# Patient Record
Sex: FEMALE | Race: WHITE | Employment: OTHER | URBAN - METROPOLITAN AREA
[De-identification: names, ages, dates, MRNs, and addresses within clinical notes are randomized per-mention and may not be internally consistent; named-entity substitution may affect disease eponyms.]

---

## 2017-11-03 ENCOUNTER — HOSPITAL ENCOUNTER (OUTPATIENT)
Dept: MAMMOGRAPHY | Age: 77
Discharge: HOME | End: 2017-11-03
Attending: INTERNAL MEDICINE
Payer: MEDICARE

## 2017-11-03 DIAGNOSIS — E66.01 MORBID OBESITY WITH BMI OF 40.0-44.9, ADULT (HCC): ICD-10-CM

## 2017-11-03 PROCEDURE — 77067 SCR MAMMO BI INCL CAD: CPT

## 2018-11-16 ENCOUNTER — HOSPITAL ENCOUNTER (OUTPATIENT)
Dept: MAMMOGRAPHY | Age: 78
Discharge: HOME | End: 2018-11-16
Attending: INTERNAL MEDICINE
Payer: MEDICARE

## 2018-11-16 DIAGNOSIS — Z78.0 ASYMPTOMATIC MENOPAUSAL STATE: ICD-10-CM

## 2018-11-16 DIAGNOSIS — N95.1 MENOPAUSAL STATE: ICD-10-CM

## 2018-11-16 DIAGNOSIS — Z12.39 SCREENING BREAST EXAMINATION: ICD-10-CM

## 2018-11-16 PROCEDURE — 77067 SCR MAMMO BI INCL CAD: CPT

## 2018-11-16 PROCEDURE — 77080 DXA BONE DENSITY AXIAL: CPT

## 2019-03-15 ENCOUNTER — HOSPITAL ENCOUNTER (OUTPATIENT)
Dept: CT IMAGING | Age: 79
Discharge: HOME | End: 2019-03-15
Attending: ORTHOPAEDIC SURGERY
Payer: MEDICARE

## 2019-03-15 DIAGNOSIS — S70.12XA HEMATOMA OF LEFT THIGH: ICD-10-CM

## 2019-03-15 PROCEDURE — 73700 CT LOWER EXTREMITY W/O DYE: CPT

## 2019-04-09 ENCOUNTER — HOSPITAL ENCOUNTER (OUTPATIENT)
Dept: PREADMISSION TESTING | Age: 79
Discharge: HOME | DRG: 571 | End: 2019-04-09
Payer: MEDICARE

## 2019-04-09 VITALS
RESPIRATION RATE: 12 BRPM | SYSTOLIC BLOOD PRESSURE: 178 MMHG | BODY MASS INDEX: 38.95 KG/M2 | DIASTOLIC BLOOD PRESSURE: 76 MMHG | TEMPERATURE: 97.8 F | WEIGHT: 242.38 LBS | HEIGHT: 66 IN | HEART RATE: 94 BPM

## 2019-04-09 LAB
ANION GAP SERPL CALC-SCNC: 9 MMOL/L (ref 5–15)
ATRIAL RATE: 73 BPM
BASOPHILS # BLD: 0.1 K/UL (ref 0–0.1)
BASOPHILS NFR BLD: 1 % (ref 0–1)
BUN SERPL-MCNC: 27 MG/DL (ref 6–20)
BUN/CREAT SERPL: 28 (ref 12–20)
CALCIUM SERPL-MCNC: 9.6 MG/DL (ref 8.5–10.1)
CALCULATED P AXIS, ECG09: 32 DEGREES
CALCULATED R AXIS, ECG10: 11 DEGREES
CALCULATED T AXIS, ECG11: 22 DEGREES
CHLORIDE SERPL-SCNC: 101 MMOL/L (ref 97–108)
CO2 SERPL-SCNC: 29 MMOL/L (ref 21–32)
CREAT SERPL-MCNC: 0.98 MG/DL (ref 0.55–1.02)
DIAGNOSIS, 93000: NORMAL
DIFFERENTIAL METHOD BLD: ABNORMAL
EOSINOPHIL # BLD: 0.1 K/UL (ref 0–0.4)
EOSINOPHIL NFR BLD: 1 % (ref 0–7)
ERYTHROCYTE [DISTWIDTH] IN BLOOD BY AUTOMATED COUNT: 15.7 % (ref 11.5–14.5)
GLUCOSE SERPL-MCNC: 95 MG/DL (ref 65–100)
HCT VFR BLD AUTO: 45.2 % (ref 35–47)
HGB BLD-MCNC: 14.5 G/DL (ref 11.5–16)
IMM GRANULOCYTES # BLD AUTO: 0.1 K/UL (ref 0–0.04)
IMM GRANULOCYTES NFR BLD AUTO: 1 % (ref 0–0.5)
LYMPHOCYTES # BLD: 1.7 K/UL (ref 0.8–3.5)
LYMPHOCYTES NFR BLD: 23 % (ref 12–49)
MCH RBC QN AUTO: 29.5 PG (ref 26–34)
MCHC RBC AUTO-ENTMCNC: 32.1 G/DL (ref 30–36.5)
MCV RBC AUTO: 92.1 FL (ref 80–99)
MONOCYTES # BLD: 0.5 K/UL (ref 0–1)
MONOCYTES NFR BLD: 7 % (ref 5–13)
NEUTS SEG # BLD: 5 K/UL (ref 1.8–8)
NEUTS SEG NFR BLD: 67 % (ref 32–75)
NRBC # BLD: 0 K/UL (ref 0–0.01)
NRBC BLD-RTO: 0 PER 100 WBC
P-R INTERVAL, ECG05: 146 MS
PLATELET # BLD AUTO: 208 K/UL (ref 150–400)
PMV BLD AUTO: 11.1 FL (ref 8.9–12.9)
POTASSIUM SERPL-SCNC: 3.3 MMOL/L (ref 3.5–5.1)
Q-T INTERVAL, ECG07: 418 MS
QRS DURATION, ECG06: 86 MS
QTC CALCULATION (BEZET), ECG08: 460 MS
RBC # BLD AUTO: 4.91 M/UL (ref 3.8–5.2)
SODIUM SERPL-SCNC: 139 MMOL/L (ref 136–145)
VENTRICULAR RATE, ECG03: 73 BPM
WBC # BLD AUTO: 7.5 K/UL (ref 3.6–11)

## 2019-04-09 PROCEDURE — 36415 COLL VENOUS BLD VENIPUNCTURE: CPT

## 2019-04-09 PROCEDURE — 80048 BASIC METABOLIC PNL TOTAL CA: CPT

## 2019-04-09 PROCEDURE — 93005 ELECTROCARDIOGRAM TRACING: CPT

## 2019-04-09 PROCEDURE — 85025 COMPLETE CBC W/AUTO DIFF WBC: CPT

## 2019-04-10 ENCOUNTER — ANESTHESIA EVENT (OUTPATIENT)
Dept: SURGERY | Age: 79
DRG: 571 | End: 2019-04-10
Payer: MEDICARE

## 2019-04-10 ENCOUNTER — ANESTHESIA (OUTPATIENT)
Dept: SURGERY | Age: 79
DRG: 571 | End: 2019-04-10
Payer: MEDICARE

## 2019-04-10 ENCOUNTER — HOSPITAL ENCOUNTER (INPATIENT)
Age: 79
LOS: 6 days | Discharge: HOME | DRG: 571 | End: 2019-04-16
Attending: ORTHOPAEDIC SURGERY | Admitting: ORTHOPAEDIC SURGERY
Payer: MEDICARE

## 2019-04-10 PROBLEM — L02.416 ABSCESS OF LEFT THIGH: Status: ACTIVE | Noted: 2019-04-10

## 2019-04-10 PROBLEM — M71.162 INFECTION OF LEFT PREPATELLAR BURSA: Status: ACTIVE | Noted: 2019-04-10

## 2019-04-10 LAB
ANION GAP SERPL CALC-SCNC: 8 MMOL/L (ref 5–15)
BUN SERPL-MCNC: 27 MG/DL (ref 6–20)
BUN/CREAT SERPL: 29 (ref 12–20)
CALCIUM SERPL-MCNC: 9.1 MG/DL (ref 8.5–10.1)
CHLORIDE SERPL-SCNC: 104 MMOL/L (ref 97–108)
CO2 SERPL-SCNC: 26 MMOL/L (ref 21–32)
CREAT SERPL-MCNC: 0.93 MG/DL (ref 0.55–1.02)
GLUCOSE BLD STRIP.AUTO-MCNC: 118 MG/DL (ref 65–100)
GLUCOSE SERPL-MCNC: 146 MG/DL (ref 65–100)
HCT VFR BLD AUTO: 41.1 % (ref 35–47)
HGB BLD-MCNC: 13.6 G/DL (ref 11.5–16)
POTASSIUM SERPL-SCNC: 2.9 MMOL/L (ref 3.5–5.1)
SERVICE CMNT-IMP: ABNORMAL
SODIUM SERPL-SCNC: 138 MMOL/L (ref 136–145)

## 2019-04-10 PROCEDURE — 77030008684 HC TU ET CUF COVD -B: Performed by: NURSE ANESTHETIST, CERTIFIED REGISTERED

## 2019-04-10 PROCEDURE — 77030002916 HC SUT ETHLN J&J -A: Performed by: ORTHOPAEDIC SURGERY

## 2019-04-10 PROCEDURE — 80048 BASIC METABOLIC PNL TOTAL CA: CPT

## 2019-04-10 PROCEDURE — 74011000250 HC RX REV CODE- 250

## 2019-04-10 PROCEDURE — 74011250636 HC RX REV CODE- 250/636: Performed by: ANESTHESIOLOGY

## 2019-04-10 PROCEDURE — 76010000149 HC OR TIME 1 TO 1.5 HR: Performed by: ORTHOPAEDIC SURGERY

## 2019-04-10 PROCEDURE — 77030020782 HC GWN BAIR PAWS FLX 3M -B

## 2019-04-10 PROCEDURE — 0JCM0ZZ EXTIRPATION OF MATTER FROM LEFT UPPER LEG SUBCUTANEOUS TISSUE AND FASCIA, OPEN APPROACH: ICD-10-PCS | Performed by: ORTHOPAEDIC SURGERY

## 2019-04-10 PROCEDURE — 65270000029 HC RM PRIVATE

## 2019-04-10 PROCEDURE — 77030019895 HC PCKNG STRP IODO -A: Performed by: ORTHOPAEDIC SURGERY

## 2019-04-10 PROCEDURE — 85018 HEMOGLOBIN: CPT

## 2019-04-10 PROCEDURE — 36415 COLL VENOUS BLD VENIPUNCTURE: CPT

## 2019-04-10 PROCEDURE — 87205 SMEAR GRAM STAIN: CPT

## 2019-04-10 PROCEDURE — 74011250637 HC RX REV CODE- 250/637: Performed by: PHYSICIAN ASSISTANT

## 2019-04-10 PROCEDURE — 74011250636 HC RX REV CODE- 250/636: Performed by: PHYSICIAN ASSISTANT

## 2019-04-10 PROCEDURE — 77030000032 HC CUF TRNQT ZIMM -B: Performed by: ORTHOPAEDIC SURGERY

## 2019-04-10 PROCEDURE — 88300 SURGICAL PATH GROSS: CPT

## 2019-04-10 PROCEDURE — 77030039497 HC CST PAD STERILE CHCS -A: Performed by: ORTHOPAEDIC SURGERY

## 2019-04-10 PROCEDURE — 82962 GLUCOSE BLOOD TEST: CPT

## 2019-04-10 PROCEDURE — 02HV33Z INSERTION OF INFUSION DEVICE INTO SUPERIOR VENA CAVA, PERCUTANEOUS APPROACH: ICD-10-PCS | Performed by: ORTHOPAEDIC SURGERY

## 2019-04-10 PROCEDURE — L1830 KO IMMOB CANVAS LONG PRE OTS: HCPCS | Performed by: ORTHOPAEDIC SURGERY

## 2019-04-10 PROCEDURE — 87075 CULTR BACTERIA EXCEPT BLOOD: CPT

## 2019-04-10 PROCEDURE — 74011250636 HC RX REV CODE- 250/636

## 2019-04-10 PROCEDURE — 77030026438 HC STYL ET INTUB CARD -A: Performed by: NURSE ANESTHETIST, CERTIFIED REGISTERED

## 2019-04-10 PROCEDURE — 77030031139 HC SUT VCRL2 J&J -A: Performed by: ORTHOPAEDIC SURGERY

## 2019-04-10 PROCEDURE — 0JBM0ZZ EXCISION OF LEFT UPPER LEG SUBCUTANEOUS TISSUE AND FASCIA, OPEN APPROACH: ICD-10-PCS | Performed by: ORTHOPAEDIC SURGERY

## 2019-04-10 PROCEDURE — 77030011640 HC PAD GRND REM COVD -A: Performed by: ORTHOPAEDIC SURGERY

## 2019-04-10 PROCEDURE — 0J9M0ZZ DRAINAGE OF LEFT UPPER LEG SUBCUTANEOUS TISSUE AND FASCIA, OPEN APPROACH: ICD-10-PCS | Performed by: ORTHOPAEDIC SURGERY

## 2019-04-10 PROCEDURE — 76060000034 HC ANESTHESIA 1.5 TO 2 HR: Performed by: ORTHOPAEDIC SURGERY

## 2019-04-10 PROCEDURE — 77030018836 HC SOL IRR NACL ICUM -A: Performed by: ORTHOPAEDIC SURGERY

## 2019-04-10 PROCEDURE — 74011250637 HC RX REV CODE- 250/637: Performed by: ANESTHESIOLOGY

## 2019-04-10 PROCEDURE — 76210000016 HC OR PH I REC 1 TO 1.5 HR: Performed by: ORTHOPAEDIC SURGERY

## 2019-04-10 RX ORDER — GUAIFENESIN 100 MG/5ML
81 LIQUID (ML) ORAL
Status: DISCONTINUED | OUTPATIENT
Start: 2019-04-10 | End: 2019-04-16 | Stop reason: HOSPADM

## 2019-04-10 RX ORDER — MIDAZOLAM HYDROCHLORIDE 1 MG/ML
0.5 INJECTION, SOLUTION INTRAMUSCULAR; INTRAVENOUS
Status: DISCONTINUED | OUTPATIENT
Start: 2019-04-10 | End: 2019-04-10 | Stop reason: HOSPADM

## 2019-04-10 RX ORDER — PROPOFOL 10 MG/ML
INJECTION, EMULSION INTRAVENOUS AS NEEDED
Status: DISCONTINUED | OUTPATIENT
Start: 2019-04-10 | End: 2019-04-10 | Stop reason: HOSPADM

## 2019-04-10 RX ORDER — PRAVASTATIN SODIUM 20 MG/1
40 TABLET ORAL
Status: DISCONTINUED | OUTPATIENT
Start: 2019-04-10 | End: 2019-04-16 | Stop reason: HOSPADM

## 2019-04-10 RX ORDER — MIDAZOLAM HYDROCHLORIDE 1 MG/ML
1 INJECTION, SOLUTION INTRAMUSCULAR; INTRAVENOUS AS NEEDED
Status: DISCONTINUED | OUTPATIENT
Start: 2019-04-10 | End: 2019-04-10 | Stop reason: HOSPADM

## 2019-04-10 RX ORDER — HYDROCODONE BITARTRATE AND ACETAMINOPHEN 5; 325 MG/1; MG/1
1 TABLET ORAL
Status: DISCONTINUED | OUTPATIENT
Start: 2019-04-10 | End: 2019-04-16 | Stop reason: HOSPADM

## 2019-04-10 RX ORDER — ONDANSETRON 2 MG/ML
INJECTION INTRAMUSCULAR; INTRAVENOUS AS NEEDED
Status: DISCONTINUED | OUTPATIENT
Start: 2019-04-10 | End: 2019-04-10 | Stop reason: HOSPADM

## 2019-04-10 RX ORDER — SODIUM CHLORIDE 9 MG/ML
100 INJECTION, SOLUTION INTRAVENOUS CONTINUOUS
Status: DISPENSED | OUTPATIENT
Start: 2019-04-10 | End: 2019-04-11

## 2019-04-10 RX ORDER — SODIUM CHLORIDE 0.9 % (FLUSH) 0.9 %
5-40 SYRINGE (ML) INJECTION EVERY 8 HOURS
Status: DISCONTINUED | OUTPATIENT
Start: 2019-04-10 | End: 2019-04-10 | Stop reason: HOSPADM

## 2019-04-10 RX ORDER — ACETAMINOPHEN 325 MG/1
650 TABLET ORAL ONCE
Status: COMPLETED | OUTPATIENT
Start: 2019-04-10 | End: 2019-04-10

## 2019-04-10 RX ORDER — EPHEDRINE SULFATE/0.9% NACL/PF 50 MG/5 ML
SYRINGE (ML) INTRAVENOUS AS NEEDED
Status: DISCONTINUED | OUTPATIENT
Start: 2019-04-10 | End: 2019-04-10 | Stop reason: HOSPADM

## 2019-04-10 RX ORDER — LIDOCAINE HYDROCHLORIDE 10 MG/ML
0.1 INJECTION, SOLUTION EPIDURAL; INFILTRATION; INTRACAUDAL; PERINEURAL AS NEEDED
Status: DISCONTINUED | OUTPATIENT
Start: 2019-04-10 | End: 2019-04-10 | Stop reason: HOSPADM

## 2019-04-10 RX ORDER — DEXAMETHASONE SODIUM PHOSPHATE 4 MG/ML
INJECTION, SOLUTION INTRA-ARTICULAR; INTRALESIONAL; INTRAMUSCULAR; INTRAVENOUS; SOFT TISSUE AS NEEDED
Status: DISCONTINUED | OUTPATIENT
Start: 2019-04-10 | End: 2019-04-10 | Stop reason: HOSPADM

## 2019-04-10 RX ORDER — AMLODIPINE BESYLATE 5 MG/1
10 TABLET ORAL DAILY
Status: DISCONTINUED | OUTPATIENT
Start: 2019-04-11 | End: 2019-04-16 | Stop reason: HOSPADM

## 2019-04-10 RX ORDER — ONDANSETRON 4 MG/1
4 TABLET, ORALLY DISINTEGRATING ORAL
Status: DISCONTINUED | OUTPATIENT
Start: 2019-04-10 | End: 2019-04-16 | Stop reason: HOSPADM

## 2019-04-10 RX ORDER — FENTANYL CITRATE 50 UG/ML
25 INJECTION, SOLUTION INTRAMUSCULAR; INTRAVENOUS
Status: DISCONTINUED | OUTPATIENT
Start: 2019-04-10 | End: 2019-04-10 | Stop reason: HOSPADM

## 2019-04-10 RX ORDER — SODIUM CHLORIDE 9 MG/ML
25 INJECTION, SOLUTION INTRAVENOUS CONTINUOUS
Status: DISCONTINUED | OUTPATIENT
Start: 2019-04-10 | End: 2019-04-10 | Stop reason: HOSPADM

## 2019-04-10 RX ORDER — ROPIVACAINE HYDROCHLORIDE 5 MG/ML
30 INJECTION, SOLUTION EPIDURAL; INFILTRATION; PERINEURAL ONCE
Status: DISCONTINUED | OUTPATIENT
Start: 2019-04-10 | End: 2019-04-10 | Stop reason: HOSPADM

## 2019-04-10 RX ORDER — FENTANYL CITRATE 50 UG/ML
INJECTION, SOLUTION INTRAMUSCULAR; INTRAVENOUS AS NEEDED
Status: DISCONTINUED | OUTPATIENT
Start: 2019-04-10 | End: 2019-04-10 | Stop reason: HOSPADM

## 2019-04-10 RX ORDER — DEXMEDETOMIDINE HYDROCHLORIDE 4 UG/ML
INJECTION, SOLUTION INTRAVENOUS AS NEEDED
Status: DISCONTINUED | OUTPATIENT
Start: 2019-04-10 | End: 2019-04-10 | Stop reason: HOSPADM

## 2019-04-10 RX ORDER — SODIUM CHLORIDE 0.9 % (FLUSH) 0.9 %
5-40 SYRINGE (ML) INJECTION AS NEEDED
Status: DISCONTINUED | OUTPATIENT
Start: 2019-04-10 | End: 2019-04-10 | Stop reason: HOSPADM

## 2019-04-10 RX ORDER — MIDAZOLAM HYDROCHLORIDE 1 MG/ML
INJECTION, SOLUTION INTRAMUSCULAR; INTRAVENOUS AS NEEDED
Status: DISCONTINUED | OUTPATIENT
Start: 2019-04-10 | End: 2019-04-10 | Stop reason: HOSPADM

## 2019-04-10 RX ORDER — SODIUM CHLORIDE, SODIUM LACTATE, POTASSIUM CHLORIDE, CALCIUM CHLORIDE 600; 310; 30; 20 MG/100ML; MG/100ML; MG/100ML; MG/100ML
75 INJECTION, SOLUTION INTRAVENOUS CONTINUOUS
Status: DISCONTINUED | OUTPATIENT
Start: 2019-04-10 | End: 2019-04-10 | Stop reason: HOSPADM

## 2019-04-10 RX ORDER — MORPHINE SULFATE 10 MG/ML
2 INJECTION, SOLUTION INTRAMUSCULAR; INTRAVENOUS
Status: DISCONTINUED | OUTPATIENT
Start: 2019-04-10 | End: 2019-04-10 | Stop reason: HOSPADM

## 2019-04-10 RX ORDER — FENTANYL CITRATE 50 UG/ML
50 INJECTION, SOLUTION INTRAMUSCULAR; INTRAVENOUS AS NEEDED
Status: DISCONTINUED | OUTPATIENT
Start: 2019-04-10 | End: 2019-04-10 | Stop reason: HOSPADM

## 2019-04-10 RX ORDER — CEFAZOLIN SODIUM/WATER 2 G/20 ML
2 SYRINGE (ML) INTRAVENOUS EVERY 8 HOURS
Status: COMPLETED | OUTPATIENT
Start: 2019-04-10 | End: 2019-04-10

## 2019-04-10 RX ORDER — ACETAMINOPHEN 325 MG/1
650 TABLET ORAL
Status: DISCONTINUED | OUTPATIENT
Start: 2019-04-10 | End: 2019-04-16 | Stop reason: HOSPADM

## 2019-04-10 RX ORDER — PHENYLEPHRINE HCL IN 0.9% NACL 0.4MG/10ML
SYRINGE (ML) INTRAVENOUS AS NEEDED
Status: DISCONTINUED | OUTPATIENT
Start: 2019-04-10 | End: 2019-04-10 | Stop reason: HOSPADM

## 2019-04-10 RX ORDER — SODIUM CHLORIDE, SODIUM LACTATE, POTASSIUM CHLORIDE, CALCIUM CHLORIDE 600; 310; 30; 20 MG/100ML; MG/100ML; MG/100ML; MG/100ML
125 INJECTION, SOLUTION INTRAVENOUS CONTINUOUS
Status: DISCONTINUED | OUTPATIENT
Start: 2019-04-10 | End: 2019-04-10 | Stop reason: HOSPADM

## 2019-04-10 RX ORDER — SODIUM CHLORIDE 0.9 % (FLUSH) 0.9 %
5-40 SYRINGE (ML) INJECTION EVERY 8 HOURS
Status: DISCONTINUED | OUTPATIENT
Start: 2019-04-10 | End: 2019-04-16 | Stop reason: HOSPADM

## 2019-04-10 RX ORDER — SUCCINYLCHOLINE CHLORIDE 20 MG/ML
INJECTION INTRAMUSCULAR; INTRAVENOUS AS NEEDED
Status: DISCONTINUED | OUTPATIENT
Start: 2019-04-10 | End: 2019-04-10 | Stop reason: HOSPADM

## 2019-04-10 RX ORDER — CLONIDINE HYDROCHLORIDE 0.1 MG/1
0.1 TABLET ORAL 2 TIMES DAILY
Status: DISCONTINUED | OUTPATIENT
Start: 2019-04-10 | End: 2019-04-13

## 2019-04-10 RX ORDER — HYDROMORPHONE HYDROCHLORIDE 1 MG/ML
0.2 INJECTION, SOLUTION INTRAMUSCULAR; INTRAVENOUS; SUBCUTANEOUS
Status: DISCONTINUED | OUTPATIENT
Start: 2019-04-10 | End: 2019-04-10 | Stop reason: HOSPADM

## 2019-04-10 RX ORDER — DIPHENHYDRAMINE HCL 25 MG
25 CAPSULE ORAL
Status: DISCONTINUED | OUTPATIENT
Start: 2019-04-10 | End: 2019-04-16 | Stop reason: HOSPADM

## 2019-04-10 RX ORDER — DIPHENHYDRAMINE HYDROCHLORIDE 50 MG/ML
12.5 INJECTION, SOLUTION INTRAMUSCULAR; INTRAVENOUS AS NEEDED
Status: DISCONTINUED | OUTPATIENT
Start: 2019-04-10 | End: 2019-04-10 | Stop reason: HOSPADM

## 2019-04-10 RX ORDER — HYDROMORPHONE HYDROCHLORIDE 1 MG/ML
1 INJECTION, SOLUTION INTRAMUSCULAR; INTRAVENOUS; SUBCUTANEOUS
Status: DISCONTINUED | OUTPATIENT
Start: 2019-04-10 | End: 2019-04-16 | Stop reason: HOSPADM

## 2019-04-10 RX ORDER — CEFAZOLIN SODIUM/WATER 2 G/20 ML
2 SYRINGE (ML) INTRAVENOUS EVERY 8 HOURS
Status: COMPLETED | OUTPATIENT
Start: 2019-04-10 | End: 2019-04-11

## 2019-04-10 RX ORDER — CHLORTHALIDONE 25 MG/1
25 TABLET ORAL DAILY
Status: DISCONTINUED | OUTPATIENT
Start: 2019-04-11 | End: 2019-04-16 | Stop reason: HOSPADM

## 2019-04-10 RX ORDER — ROCURONIUM BROMIDE 10 MG/ML
INJECTION, SOLUTION INTRAVENOUS AS NEEDED
Status: DISCONTINUED | OUTPATIENT
Start: 2019-04-10 | End: 2019-04-10 | Stop reason: HOSPADM

## 2019-04-10 RX ORDER — NALOXONE HYDROCHLORIDE 0.4 MG/ML
0.4 INJECTION, SOLUTION INTRAMUSCULAR; INTRAVENOUS; SUBCUTANEOUS AS NEEDED
Status: DISCONTINUED | OUTPATIENT
Start: 2019-04-10 | End: 2019-04-16 | Stop reason: HOSPADM

## 2019-04-10 RX ORDER — ONDANSETRON 2 MG/ML
4 INJECTION INTRAMUSCULAR; INTRAVENOUS AS NEEDED
Status: DISCONTINUED | OUTPATIENT
Start: 2019-04-10 | End: 2019-04-10 | Stop reason: HOSPADM

## 2019-04-10 RX ORDER — SODIUM CHLORIDE 0.9 % (FLUSH) 0.9 %
5-40 SYRINGE (ML) INJECTION AS NEEDED
Status: DISCONTINUED | OUTPATIENT
Start: 2019-04-10 | End: 2019-04-16 | Stop reason: HOSPADM

## 2019-04-10 RX ORDER — LIDOCAINE HYDROCHLORIDE 20 MG/ML
INJECTION, SOLUTION EPIDURAL; INFILTRATION; INTRACAUDAL; PERINEURAL AS NEEDED
Status: DISCONTINUED | OUTPATIENT
Start: 2019-04-10 | End: 2019-04-10 | Stop reason: HOSPADM

## 2019-04-10 RX ADMIN — SODIUM CHLORIDE 100 ML/HR: 900 INJECTION, SOLUTION INTRAVENOUS at 23:21

## 2019-04-10 RX ADMIN — Medication 5 MG: at 11:25

## 2019-04-10 RX ADMIN — DEXMEDETOMIDINE HYDROCHLORIDE 4 MCG: 4 INJECTION, SOLUTION INTRAVENOUS at 11:08

## 2019-04-10 RX ADMIN — Medication 10 MG: at 11:33

## 2019-04-10 RX ADMIN — Medication 5 MG: at 11:15

## 2019-04-10 RX ADMIN — Medication 2 G: at 19:43

## 2019-04-10 RX ADMIN — ACETAMINOPHEN 650 MG: 325 TABLET ORAL at 19:43

## 2019-04-10 RX ADMIN — Medication 40 MCG: at 11:21

## 2019-04-10 RX ADMIN — FENTANYL CITRATE 50 MCG: 50 INJECTION, SOLUTION INTRAMUSCULAR; INTRAVENOUS at 11:57

## 2019-04-10 RX ADMIN — PROPOFOL 50 MG: 10 INJECTION, EMULSION INTRAVENOUS at 11:56

## 2019-04-10 RX ADMIN — DEXAMETHASONE SODIUM PHOSPHATE 4 MG: 4 INJECTION, SOLUTION INTRA-ARTICULAR; INTRALESIONAL; INTRAMUSCULAR; INTRAVENOUS; SOFT TISSUE at 10:57

## 2019-04-10 RX ADMIN — ASPIRIN 81 MG CHEWABLE TABLET 81 MG: 81 TABLET CHEWABLE at 22:47

## 2019-04-10 RX ADMIN — ACETAMINOPHEN 650 MG: 325 TABLET ORAL at 10:38

## 2019-04-10 RX ADMIN — SODIUM CHLORIDE 100 ML/HR: 900 INJECTION, SOLUTION INTRAVENOUS at 13:42

## 2019-04-10 RX ADMIN — LIDOCAINE HYDROCHLORIDE 60 MG: 20 INJECTION, SOLUTION EPIDURAL; INFILTRATION; INTRACAUDAL; PERINEURAL at 10:49

## 2019-04-10 RX ADMIN — Medication 40 MCG: at 10:50

## 2019-04-10 RX ADMIN — PRAVASTATIN SODIUM 40 MG: 20 TABLET ORAL at 22:47

## 2019-04-10 RX ADMIN — DEXMEDETOMIDINE HYDROCHLORIDE 4 MCG: 4 INJECTION, SOLUTION INTRAVENOUS at 11:57

## 2019-04-10 RX ADMIN — Medication 25 MG: at 12:10

## 2019-04-10 RX ADMIN — DEXMEDETOMIDINE HYDROCHLORIDE 4 MCG: 4 INJECTION, SOLUTION INTRAVENOUS at 11:21

## 2019-04-10 RX ADMIN — PROPOFOL 20 MG: 10 INJECTION, EMULSION INTRAVENOUS at 11:04

## 2019-04-10 RX ADMIN — ONDANSETRON 4 MG: 2 INJECTION INTRAMUSCULAR; INTRAVENOUS at 10:57

## 2019-04-10 RX ADMIN — Medication 100 MCG: at 12:07

## 2019-04-10 RX ADMIN — PROPOFOL 50 MG: 10 INJECTION, EMULSION INTRAVENOUS at 10:51

## 2019-04-10 RX ADMIN — Medication 80 MCG: at 11:32

## 2019-04-10 RX ADMIN — Medication 80 MCG: at 10:57

## 2019-04-10 RX ADMIN — PROPOFOL 100 MG: 10 INJECTION, EMULSION INTRAVENOUS at 10:50

## 2019-04-10 RX ADMIN — FENTANYL CITRATE 50 MCG: 50 INJECTION, SOLUTION INTRAMUSCULAR; INTRAVENOUS at 10:49

## 2019-04-10 RX ADMIN — Medication 2 G: at 10:57

## 2019-04-10 RX ADMIN — SUCCINYLCHOLINE CHLORIDE 120 MG: 20 INJECTION INTRAMUSCULAR; INTRAVENOUS at 10:51

## 2019-04-10 RX ADMIN — MIDAZOLAM HYDROCHLORIDE 2 MG: 1 INJECTION, SOLUTION INTRAMUSCULAR; INTRAVENOUS at 10:45

## 2019-04-10 RX ADMIN — SODIUM CHLORIDE, POTASSIUM CHLORIDE, SODIUM LACTATE AND CALCIUM CHLORIDE: 600; 310; 30; 20 INJECTION, SOLUTION INTRAVENOUS at 10:40

## 2019-04-10 RX ADMIN — FENTANYL CITRATE 50 MCG: 50 INJECTION, SOLUTION INTRAMUSCULAR; INTRAVENOUS at 11:17

## 2019-04-10 RX ADMIN — Medication 5 MG: at 11:29

## 2019-04-10 RX ADMIN — Medication 40 MCG: at 11:10

## 2019-04-10 RX ADMIN — DEXMEDETOMIDINE HYDROCHLORIDE 4 MCG: 4 INJECTION, SOLUTION INTRAVENOUS at 11:04

## 2019-04-10 RX ADMIN — ROCURONIUM BROMIDE 5 MG: 10 INJECTION, SOLUTION INTRAVENOUS at 10:50

## 2019-04-10 RX ADMIN — DEXMEDETOMIDINE HYDROCHLORIDE 4 MCG: 4 INJECTION, SOLUTION INTRAVENOUS at 11:53

## 2019-04-10 RX ADMIN — Medication 10 ML: at 19:45

## 2019-04-10 NOTE — PERIOP NOTES
TRANSFER - OUT REPORT:    Verbal report given to SANJANA BALL(name) on Bethany Sierra  being transferred to 577(unit) for routine post - op       Report consisted of patients Situation, Background, Assessment and   Recommendations(SBAR). Time Pre op antibiotic given:1057  Anesthesia Stop time: 6572  Castelan Present on Transfer to floor:no  Order for Castelan on Chart:no  Discharge Prescriptions with Chart:no    Information from the following report(s) SBAR, OR Summary, Procedure Summary, Intake/Output, MAR, Recent Results, Med Rec Status, Cardiac Rhythm NSR and Alarm Parameters  was reviewed with the receiving nurse. Opportunity for questions and clarification was provided. Is the patient on 02? YES       L/Min 2       Other nc    Is the patient on a monitor? NO    Is the nurse transporting with the patient? NO    Surgical Waiting Area notified of patient's transfer from PACU? YES      The following personal items collected during your admission accompanied patient upon transfer:   Dental Appliance: Dental Appliances: Lowers, Uppers(uppers and lowers to pacu)  Vision:    Hearing Aid:    Jewelry: Jewelry: None  Clothing: Clothing: (clothes to pacu)  Other Valuables:  Other Valuables: Cane(cane to pacu)  Valuables sent to safe:

## 2019-04-10 NOTE — H&P
Chief Complaint: Follow-up of the Left Knee     Dawit Oliver, who has a SHx significant for distal femur ORIF in 12/21/17, comes in to the clinic today for follow-up of her left knee. Patient initially had a contact injury about the anterior aspect of the left knee earlier this month. Subsequently, she developed erythema and swelling about the patella superiorly. She eventually developed cellulitis and an abscess about the bursa to the left knee. We've been treating her conservatively for this with po antibiotics. We've recently switched her over to Cipro. Today, the patient notes that she has some healing about the anteromedial aspect of the knee with some subcutaneous scabbing over the area; however, she did note that the area opened yesterday and she had some purulent drainage. No recent injuries.         Review of Systems   4/9/2019     Constitutional: Unexplained: Positive  Genitourinary: Frequent Urination: Negative  HEENT: Vision Loss: Negative  Neurological: Memory Loss: Negative  Integumentary: Rash: Positive  Cardiovascular: Palpatations: Negative  Hematologic: Bruises/Bleeds Easily: Positive  Gastrointestinal: Constipation: Negative  Immunological: Seasonal Allergies: Negative  Musculoskeletal: Joint Pain: Negative     Medical History        Past Medical History:   Diagnosis Date    no relevant past medical history              Surgical History         Past Surgical History:   Procedure Laterality Date    HIP SURGERY        JOINT REPLACEMENT        NO RELEVANT SURGERIES                Objective:      There were no vitals filed for this visit.     Constitutional:  No acute distress. Well nourished. Well developed. Eyes:  Sclera are nonicteric. Respiratory:  No labored breathing. Cardiovascular:  No marked edema. Skin:  No marked skin ulcers. Neurological:  No marked sensory loss noted. Psychiatric: Alert and oriented x3.   Musculoskeletal      On exam of the left knee reveals a small opening about the anteromedial aspect of the knee. There appears to a small amount of subcutaneous opening area perhaps into the VMO with a small amount of purulent material. No neck pain. No numbness or paresthesias in the hands. No effusion. No cellulitis or rash. I detect no motor or sensory deficits in the upper extremities. The neurovascular status of the upper extremities is intact.        Imaging/Studies:            No imaging obtained      Assessment:          Patient Active Problem List   Diagnosis    Essential hypertension    Morbid obesity with BMI of 40.0-44.9, adult    Osteoarthritis of right hip    Primary localized osteoarthrosis, pelvic region and thigh    Hyperlipidemia             ICD-10-CM   1. Cellulitis of other specified site L03.818   2. Abscess of bursa of left knee M71.062   3. Purulent abscess L02.91            Plan:   I reviewed my findings with the patient. With her persistent purulent drainage, I am now concerned underlying fluid collection. The patient has been on antibiotics and wound care. At this point, I think we need to proceed with I&D and debridement of the left knee. I explained the hospitalization, post-op and rehabilitation for the procedure. We discussed all complications associated with the surgery, including the chance of infection, DVT, and pulmonary embolus. I explained the use of antibiotics and anti-coagulants following surgery to prevent infection and DVT and we discussed the course of post-op physical therapy for rehabilitation.     She was in agreement with the proposed plan. She will follow-up with us tomorrow for I&D and debridement of the left knee. Based on today's findings, this should not involve the knee joint. Depending on the surgical findings, we may send the patient home or admit her to the hospital for IV antibiotics prn.        PROCEDURE: Incision and drainage of left prepatellar bursa, possible bursectomy

## 2019-04-10 NOTE — BRIEF OP NOTE
BRIEF OPERATIVE NOTE    Date of Procedure: 4/10/2019   Preoperative Diagnosis: LEFT PREPATELLAR BURSA  Postoperative Diagnosis: LEFT PREPATELLAR BURSA    Procedure(s):  INCISION AND DRAINAGE LEFT PREPATELLAR BURSA  Surgeon(s) and Role:     * Christian Clement MD - Primary         Surgical Assistant: Sadia Almeida PA-C    Surgical Staff:  Circ-1: Leroy Ndiaye RN  Circ-Relief: Kaylyn Martins  Circ-Intern: Galdino Mckinney RN  Physician Assistant: Selam Varela PA-C  Scrub Tech-1: Diane Goldstein  Scrub Tech-Relief: June Patricio  Surg Asst-1: Kvng Baker  Event Time In Time Out   Incision Start 1116    Incision Close 1203      Anesthesia: General   Estimated Blood Loss: 20 mL  Specimens:   ID Type Source Tests Collected by Time Destination   1 : foreign body medial left thigh Fresh Thigh  Christian Clement MD 4/10/2019 1140 Pathology   1 : left knee hematoma medial side Wound Knee  AEROBIC/ANAEROBIC CULTURE Christian Clement MD 4/10/2019 1127 Microbiology   2 : left knee hematoma medial side Wound Knee  AEROBIC/ANAEROBIC CULTURE Christian Clement MD 4/10/2019 1129 Microbiology      Findings: Foreign body medial thigh, abscess   Complications: None.   Implants: * No implants in log *

## 2019-04-10 NOTE — ANESTHESIA PREPROCEDURE EVALUATION
Relevant Problems No relevant active problems Anesthetic History No history of anesthetic complications Review of Systems / Medical History Patient summary reviewed, nursing notes reviewed and pertinent labs reviewed Pulmonary Within defined limits Neuro/Psych Within defined limits Cardiovascular Hypertension: well controlled GI/Hepatic/Renal 
Within defined limits Endo/Other Morbid obesity and arthritis Other Findings Physical Exam 
 
Airway Mallampati: II 
TM Distance: > 6 cm Neck ROM: normal range of motion Mouth opening: Normal 
 
 Cardiovascular Regular rate and rhythm,  S1 and S2 normal,  no murmur, click, rub, or gallop Dental 
No notable dental hx Pulmonary Breath sounds clear to auscultation Abdominal 
GI exam deferred Other Findings Anesthetic Plan ASA: 3 Anesthesia type: general 
 
 
 
 
Induction: Intravenous Anesthetic plan and risks discussed with: Patient

## 2019-04-10 NOTE — ANESTHESIA POSTPROCEDURE EVALUATION
Post-Anesthesia Evaluation and Assessment Patient: Med Galvez MRN: [id number]  SSN: [id number] YOB: 1940  Age: 70 y.o. Sex: female I have evaluated the patient and they are stable and ready for discharge from the PACU. Cardiovascular Function/Vital Signs Visit Vitals /44 Pulse 71 Temp 36.7 °C (98.1 °F) Resp 17 Ht 5' 6\" (1.676 m) Wt 109.8 kg (242 lb) SpO2 95% BMI 39.06 kg/m² Patient is status post General anesthesia for Procedure(s): 
INCISION AND DRAINAGE LEFT PREPATELLAR BURSA. Nausea/Vomiting: None Postoperative hydration reviewed and adequate. Pain: 
Pain Scale 1: Numeric (0 - 10) (04/10/19 1007) Pain Intensity 1: 0 (04/10/19 1007) Managed Neurological Status:  
Neuro (WDL): Within Defined Limits (04/10/19 1032) At baseline Mental Status, Level of Consciousness: Alert and  oriented to person, place, and time Pulmonary Status:  
O2 Device: Nasal cannula (04/10/19 1219) Adequate oxygenation and airway patent Complications related to anesthesia: None Post-anesthesia assessment completed. No concerns Signed By: George Perez MD   
 April 10, 2019 Procedure(s): 
INCISION AND DRAINAGE LEFT PREPATELLAR BURSA. 
 
general 
 
<BSHSIANPOST> Vitals Value Taken Time /44 4/10/2019 12:19 PM  
Temp 36.7 °C (98.1 °F) 4/10/2019 12:19 PM  
Pulse 74 4/10/2019 12:21 PM  
Resp 18 4/10/2019 12:21 PM  
SpO2 95 % 4/10/2019 12:21 PM  
Vitals shown include unvalidated device data.

## 2019-04-11 LAB
CRP SERPL-MCNC: 0.67 MG/DL (ref 0–0.6)
ERYTHROCYTE [SEDIMENTATION RATE] IN BLOOD: 40 MM/HR (ref 0–30)

## 2019-04-11 PROCEDURE — 74011250636 HC RX REV CODE- 250/636: Performed by: PHYSICIAN ASSISTANT

## 2019-04-11 PROCEDURE — 86140 C-REACTIVE PROTEIN: CPT

## 2019-04-11 PROCEDURE — 74011250636 HC RX REV CODE- 250/636: Performed by: INTERNAL MEDICINE

## 2019-04-11 PROCEDURE — 74011000258 HC RX REV CODE- 258: Performed by: INTERNAL MEDICINE

## 2019-04-11 PROCEDURE — 65270000029 HC RM PRIVATE

## 2019-04-11 PROCEDURE — 85652 RBC SED RATE AUTOMATED: CPT

## 2019-04-11 PROCEDURE — 74011250637 HC RX REV CODE- 250/637: Performed by: PHYSICIAN ASSISTANT

## 2019-04-11 PROCEDURE — 94760 N-INVAS EAR/PLS OXIMETRY 1: CPT

## 2019-04-11 PROCEDURE — 36415 COLL VENOUS BLD VENIPUNCTURE: CPT

## 2019-04-11 RX ADMIN — ACETAMINOPHEN 650 MG: 325 TABLET ORAL at 14:53

## 2019-04-11 RX ADMIN — CLONIDINE HYDROCHLORIDE 0.1 MG: 0.1 TABLET ORAL at 17:48

## 2019-04-11 RX ADMIN — PIPERACILLIN SODIUM,TAZOBACTAM SODIUM 3.38 G: 3; .375 INJECTION, POWDER, FOR SOLUTION INTRAVENOUS at 13:43

## 2019-04-11 RX ADMIN — ACETAMINOPHEN 650 MG: 325 TABLET ORAL at 02:42

## 2019-04-11 RX ADMIN — Medication 2 G: at 03:00

## 2019-04-11 RX ADMIN — PIPERACILLIN SODIUM,TAZOBACTAM SODIUM 3.38 G: 3; .375 INJECTION, POWDER, FOR SOLUTION INTRAVENOUS at 05:37

## 2019-04-11 RX ADMIN — ASPIRIN 81 MG CHEWABLE TABLET 81 MG: 81 TABLET CHEWABLE at 21:49

## 2019-04-11 RX ADMIN — PRAVASTATIN SODIUM 40 MG: 20 TABLET ORAL at 21:49

## 2019-04-11 RX ADMIN — ACETAMINOPHEN 650 MG: 325 TABLET ORAL at 08:26

## 2019-04-11 RX ADMIN — PIPERACILLIN SODIUM,TAZOBACTAM SODIUM 3.38 G: 3; .375 INJECTION, POWDER, FOR SOLUTION INTRAVENOUS at 21:49

## 2019-04-11 NOTE — PROGRESS NOTES
Infectious Diseases Consultation     Please see dictated note     Impression      1. Indolent infection distal left thigh S/P ORIF of an open comminuted fracture of the distal left femur in Dec 2017 -- S/P surgical I&D on 4/10/2019 with removal of a foreign body deep in the wound: Difficult problem as she likely has infection of the hardware and fracture site. The bone has not healed enough to allow removal of the hardware. 2. HTN    3. Obesity      Recommend:      1. Zosyn pending intraop cultures    2.  Baseline ESR and CRP       Thanks,   Bishop Herlinda MD  4/10/2019  9:34 PM

## 2019-04-11 NOTE — PROGRESS NOTES
ORTHO POSTOP PROGRESS NOTE      Subjective:     Post-Operative Day: 1 Status Post left thigh I & D  Systemic or Specific Complaints:No Complaints    Objective:     Patient Vitals for the past 24 hrs:   BP Temp Pulse Resp SpO2   04/11/19 0821 119/70 97.9 °F (36.6 °C) 81 16 95 %   04/11/19 0345 135/59 97.3 °F (36.3 °C) 65 16 94 %   04/10/19 2135 115/67 97.3 °F (36.3 °C) 67 16 93 %   04/10/19 1850 122/62 98 °F (36.7 °C) 80 16 94 %   04/10/19 1630 130/77 97.7 °F (36.5 °C) 72 14 94 %   04/10/19 1515 122/65 97.7 °F (36.5 °C) 73 16 94 %   04/10/19 1419 123/77 96.4 °F (35.8 °C) 73 16 96 %   04/10/19 1351 -- 97.9 °F (36.6 °C) -- -- --   04/10/19 1318 127/49 -- 71 16 96 %   04/10/19 1300 118/48 -- 70 13 96 %       General: alert, cooperative, no distress, appears stated age   Wound: Wound clean and dry no evidence of infection. , No Erythema and No Drainage   Motion: Extension: Full Extension   DVT Exam: No evidence of DVT seen on physical exam.  Negative Alicia's sign. No cords or calf tenderness. No significant calf/ankle edema. Data Review:    Recent Results (from the past 24 hour(s))   SED RATE (ESR)    Collection Time: 04/11/19  3:00 AM   Result Value Ref Range    Sed rate, automated 40 (H) 0 - 30 mm/hr   C REACTIVE PROTEIN, QT    Collection Time: 04/11/19  3:00 AM   Result Value Ref Range    C-Reactive protein 0.67 (H) 0.00 - 0.60 mg/dL         Assessment:     Status Post left thigh I & D. Doing well postoperatively. Ambulating with knee immobilizer. Pain managed by Tylenol. Awaiting further culture results    Plan:     Continues current post-op course  Follow cultures for further abx decisions.

## 2019-04-11 NOTE — PROGRESS NOTES
Care Management Interventions  PCP Verified by CM: Yes  Mode of Transport at Discharge: Other (see comment)(family)  Transition of Care Consult (CM Consult): Discharge Planning  MyChart Signup: No  Discharge Durable Medical Equipment: No  Physical Therapy Consult: No  Occupational Therapy Consult: No  Speech Therapy Consult: No  Current Support Network: Lives with Spouse, Own Home  Confirm Follow Up Transport: Family  Plan discussed with Pt/Family/Caregiver: Yes  Freedom of Choice Offered: Yes  Discharge Location  Discharge Placement: Home(, TBD)     Reason for Admission:  left thigh I & D                    RRAT Score:   7                  Plan for utilizing home health:    No orders. Patient has used [facility] in the past.                      Current Advanced Directive/Advance Care Plan:not on file    Likelihood of Readmission:  low                         Transition of Care Plan:  Home, TBD. Chart reviewed. CM met with patient at bedside. Patient lives with her  and is independent with ADLs. Noted ID consult. Cultures are pending. Care management will continue to follow for ID plan/discharge planning.     Felisha Riggs BSW/CRM

## 2019-04-11 NOTE — CONSULTS
[address]    Name:  Juan Francisco De Paz  MR#:  [id number]  :  1940  ACCOUNT #:  [de-identified]  DATE OF SERVICE:  04/10/2019    Amended document - corrections made to body of document; 19; hawkins    The patient is currently in room 577. ATTENDING PHYSICIAN:  Ayala Zuñiga MD    CHIEF COMPLAINT:  Drainage from the distal anterior left thigh. 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 The consultation was requested by Dr. Ayala Zuñiga. The history is obtained, but I discussed the case with Dr. Sharon Hatchet, interviewed the patient, and review of the medical records. HISTORY OF PRESENT ILLNESS:  This patient is a 53-year-old white female with history of hypertension, but no other significant medical problems. In 2017, the patient was visiting family in [place]. She fell and, by her description, she sustained an open comminuted fracture of the distal left femur. She was admitted to a hospital in [place] that night and the following day, she underwent open reduction and internal fixation of the fracture. The following day, she was discharged to a local rehabilitation center and spent 6 weeks there. Following that, she returned to [place]. She reports that the incision healed nicely without any difficulty. She has been doing well since then with no significant pain. She ambulates independently with a cane. Perhaps about a month ago, the patient bumped her leg onto a bed at home. She really did not think that there was any significant trauma; however, shortly after that, she began to develop some swelling of the distal anterior left thigh with overlying erythema. This persisted.   The patient saw Dr. Sharon Hatchet in the office and at about that time, she spontaneously drained fluid from the distal anterior left thigh. She describes initially serous fluid then bloody fluid. She really did not recall having purulent drainage. The patient has been treated with 2 or 3 different oral antibiotics prior to admission. Most recently, she has been on Cipro. Cultures have been obtained, but those results are not currently available. The patient had a CT scan of the thigh done that showed that the bone was about 25-50% healed. The CT scan was done on 03/15/2019. In addition, there was a 6.3 x 2.3 x 4.7 cm area of heterogeneous increased attenuation in the subcutaneous adipose tissues of the medial anterior distal thigh. It was felt that this could represent a hematoma. Today, the patient was admitted to the hospital because of concerns that the underlying hardware might be infected. She underwent surgical exploration and debridement. The hardware was left in place. During the procedure, a small foreign body was found deep in the wound, close to the femur. It looked like a piece of a surgical glove about the size of postage stamp. This was removed. I note that the patient is on perioperative Ancef and we are now asked to see her for further evaluation. At the present time, she is resting comfortably and has no other new complaints. She really does not recall having any fever, chills, sweats, or systemic symptoms prior to admission. She does not recall pain with weightbearing. PAST MEDICAL HISTORY:  Tobacco:  She used to smoke less than a pack per day from the time she was a teenager until she was probably in her early 35s. Alcohol:  She has 1 glass of wine per night. MEDICATIONS PRIOR TO ADMISSION:  1. Amlodipine 10 mg p.o. daily. 2.  Chlorthalidone 25 mg p.o. daily. 3.  Clonidine 0.1 mg p.o. b.i.d.  4.  Aspirin 81 mg p.o. daily. 5.  Pravastatin 40 mg p.o. nightly. ALLERGIES:  1.  SULFA - RASH. 2.  CHLORHEXIDINE. 3.  ADHESIVE TAPE. 4.  EGGS - RASH. ILLNESSES:  Hypertension. SURGERIES:  1. Appendectomy. 2.  Right total hip replacement. 3.  Open reduction and internal fixation of an open comminuted fracture of the distal left femur in 2017.  4.  Surgical I and D of the distal anterior left thigh on 04/10/2019. Of note, during the surgery, a small foreign body was found in the deep aspect of the thigh and it looked like it was a piece of a surgical glove. SOCIAL HISTORY:  The patient lives with her . She had 4 children, but 1 is . FAMILY HISTORY:  Her mother had bipolar disorder and father  of some sort of heart trouble. She has no siblings. REVIEW OF SYSTEMS:  CONSTITUTIONAL:  No fever, chills, sweats. HEENT:  No headache or visual changes. No sore throat. LYMPHATIC:  No history of adenopathy. DERMATOLOGIC:  No recent rashes. RESPIRATORY:  No cough, pleuritic chest pain, hemoptysis. CARDIOVASCULAR:  No history of chest pain or heart murmurs. GASTROINTESTINAL:  No nausea, vomiting, abdominal pain, diarrhea. GENITOURINARY:  No dysuria. MUSCULOSKELETAL:  Negative except as in HPI. NEUROLOGIC:  No history of seizure, stroke, syncope. PHYSICAL EXAMINATION:  GENERAL:  No acute distress. VITAL SIGNS:  Blood pressure is 122/62, heart rate 80, respiratory rate 16. She is afebrile. Weight 242 pounds. HEENT:  Sclerae and conjunctivae benign, oropharynx unremarkable. NECK:  Supple. Nodes, no adenopathy. SKIN:  No rashes. LUNGS:  Clear. CARDIOVASCULAR:  Regular rate and rhythm with soft systolic murmur. ABDOMEN:  Soft, nondistended, nontender, no masses or HSM, bowel sounds are present. BACK:  No CVA tenderness. EXTREMITIES:  Right leg unremarkable. Left leg has fresh surgical dressing in place over the distal left femur and this was not removed. NEUROLOGIC:  Alert and oriented, cranial nerves intact, no focal motor deficits. LABORATORY DATA:  CBC reveals hemoglobin of 14.5, white count 7500, and platelets 253,566.   BUN is 27, creatinine 0.93.    MICROBIOLOGY DATA:  Outpatient cultures of wound drainage are not currently available. Two samples were sent from surgery today. No organisms seen on the Gram stain, but the cultures are pending. I note that the patient had been on Cipro and then other oral antibiotics before that before this admission. IMPRESSION:  1. Indolent infection of the distal left thigh status post open reduction and internal fixation of an open comminuted fracture of the distal left femur in 12/2017 - status post surgical incision and drainage on 04/10/2019, with removal of a foreign body deep in the wound:  I think that this patient probably had a chronic low-grade infection that has recently flared up. She bumped her leg at least a month prior to admission, but I doubt that she had any specific trauma or hematoma. However, even a minor bump may have stirred things up a little bit. Today, the patient underwent debridement of this area and the foreign body was removed. The hardware was not removed since it appeared that the distal femur was not healed well enough to support her weight. I discussed situation with her. I explained it may be difficult to eradicate the infection with hardware in place. However, at the present time, we will give antibiotics and watch. I will treat her with Zosyn pending intraoperative culture results. 2.  Hypertension. 3.  Obesity. PLAN:  1. Zosyn pending intraoperative cultures. 2.  I will check a baseline sedimentation rate and C-reactive protein. Thank you very much for asking me to see this patient with you.         Goran Camren MD      [id number]  D:  04/11/2019 0:59  T:  04/11/2019 1:04  JOB #:  [id number]  CC:   Divine Lund MD

## 2019-04-11 NOTE — PROGRESS NOTES
Bedside and Verbal shift change report given to Lennox Likes (oncoming nurse) by [address] [place] [address] and [employer] (offgoing nurse). Report included the following information SBAR, Kardex, Procedure Summary, Intake/Output, MAR and Recent Results.

## 2019-04-11 NOTE — PROGRESS NOTES
A Spiritual Care Partner Volunteer visited patient in Rm 577 on 4/11/2019.   Documented by:  Chaplain English MDiv, MS, [facility]  [phone] ([phone])

## 2019-04-11 NOTE — OP NOTES
[address]  OPERATIVE REPORT    Name:  Reinaldo Correa  MR#:  [id number]  :  1940  ACCOUNT #:  [de-identified]  DATE OF SERVICE:  04/10/2019      PREOPERATIVE DIAGNOSIS:  Infected hematoma, medial left thigh. POSTOPERATIVE DIAGNOSIS:  Infected hematoma, medial left thigh. PROCEDURE PERFORMED:  Incision and drainage of infected hematoma, medial thigh. SURGEON:  Dottie Piedra MD    ASSISTANT:  Vickie Manrique PA-C    ANESTHESIA:  General.    COMPLICATIONS:  none    SPECIMENS REMOVED:  1. Cultures. 2.  Foreign body. DRAIN:  Iodoform gauze. IMPLANTS:  None. ESTIMATED BLOOD LOSS:  Minimal.    FINDINGS:  Listed in operative report. INDICATIONS:  The patient sustained a comminuted distal femur fracture on the left approximately 16 months ago. She underwent ORIF near [facility]. She has done well since that time. Approximately 5 weeks ago, she struck the medial aspect of her distal thigh on a metal object and developed soft tissue swelling. We saw her in the office approximately 1 week later with some erythema. It was felt that she had contusion and perhaps hematoma. She was treated conservatively. She then developed some drainage. The drainage grew Serratia. The drainage is intermittent with some days there is drainage and other days not. She initially was felt to have responded to oral antibiotics, but continues to have a small amount of drainage and now presents for the above procedure. She had a CT scan revealing 25%-50% healing of the fracture. No obvious involvement of the knee joint. She has never had an effusion or concerns of infection of the knee joint. She now presents for the above procedure. PROCEDURE:  On day of operation, the patient was taken to the operating room, placed in supine position. General anesthesia administered. The left lower extremity was prepped and draped in the usual fashion.   After elevation, the tourniquet inflated to 300 mmHg. The lateral aspect of the knee was aspirated and there was no fluid found at all indicating no obvious involvement of the knee joint itself. The area of concern about the medial vastus lateralis was evaluated and a small incision made. It was carried through subcutaneous tissue. At this point, some purulent material was encountered and sent for culture. The superficial area was debrided and felt to largely be fat necrosis. Dissection was carried deeper down to the vastus lateralis. This seemed to track in that area. It coincided with the region of the medial aspect of the distal femur fracture. At this point, a rubbery foreign body was encountered and removed. It had the appearance of a torn piece of surgical glove. At this point, all OR staff examined their gloves and there was no evidence of a rent or tear in any of the gloves on the surgical team.  The material had the appearance of a glove, but was sent to Pathology for further identification. The area was thoroughly debrided and appeared to be largely within the muscle of the vastus medialis. There was no obvious exposure of hardware. The possibility of infection of the hardware was entertained, but no further purulence was noted. The area was sterilely debrided and irrigated. The incision closed with 0 nylon in vertical mattress suture fashion. The puncture wound in the center was packed with Iodoform gauze. Sterile dressings applied. Tourniquet released. The patient was placed in a knee immobilizer and taken to recovery room in satisfactory condition. The assistant, Chad Eid PA-C, assisted me with positioning, retraction, incision closure.       Neida Solis MD      [id number]  D:  04/10/2019 13:17  T:  04/10/2019 13:22  JOB #:  [id number]  CC:  MD Arlyn Villalobos MD

## 2019-04-11 NOTE — PROGRESS NOTES
Pt ambulated for first time pre-op to bathroom with Natalie Plank and PCT assistance; Pt safely ambulated with rolling walker and gait belt with minimal pain. Pt returned to bed and ice reapplied to left leg.

## 2019-04-12 ENCOUNTER — HOME HEALTH ADMISSION (OUTPATIENT)
Dept: HOME HEALTH SERVICES | Facility: HOME HEALTH | Age: 79
End: 2019-04-12

## 2019-04-12 PROCEDURE — 74011000258 HC RX REV CODE- 258: Performed by: INTERNAL MEDICINE

## 2019-04-12 PROCEDURE — 65270000029 HC RM PRIVATE

## 2019-04-12 PROCEDURE — 74011250637 HC RX REV CODE- 250/637: Performed by: PHYSICIAN ASSISTANT

## 2019-04-12 PROCEDURE — 74011250636 HC RX REV CODE- 250/636: Performed by: INTERNAL MEDICINE

## 2019-04-12 RX ADMIN — Medication 10 ML: at 21:56

## 2019-04-12 RX ADMIN — ASPIRIN 81 MG CHEWABLE TABLET 81 MG: 81 TABLET CHEWABLE at 21:55

## 2019-04-12 RX ADMIN — ACETAMINOPHEN 650 MG: 325 TABLET ORAL at 00:38

## 2019-04-12 RX ADMIN — AMLODIPINE BESYLATE 10 MG: 5 TABLET ORAL at 08:26

## 2019-04-12 RX ADMIN — PIPERACILLIN SODIUM,TAZOBACTAM SODIUM 3.38 G: 3; .375 INJECTION, POWDER, FOR SOLUTION INTRAVENOUS at 05:31

## 2019-04-12 RX ADMIN — CHLORTHALIDONE 25 MG: 25 TABLET ORAL at 08:26

## 2019-04-12 RX ADMIN — PIPERACILLIN SODIUM,TAZOBACTAM SODIUM 3.38 G: 3; .375 INJECTION, POWDER, FOR SOLUTION INTRAVENOUS at 21:55

## 2019-04-12 RX ADMIN — PRAVASTATIN SODIUM 40 MG: 20 TABLET ORAL at 21:55

## 2019-04-12 RX ADMIN — PIPERACILLIN SODIUM,TAZOBACTAM SODIUM 3.38 G: 3; .375 INJECTION, POWDER, FOR SOLUTION INTRAVENOUS at 14:35

## 2019-04-12 RX ADMIN — CLONIDINE HYDROCHLORIDE 0.1 MG: 0.1 TABLET ORAL at 19:32

## 2019-04-12 RX ADMIN — Medication 10 ML: at 14:00

## 2019-04-12 NOTE — PROGRESS NOTES
TOTAL KNEE PROGRESS NOTE      Subjective:     Post-Operative Day: 2 Status Post left thigh I & D. Systemic or Specific Complaints:No Complaints    Objective:     Patient Vitals for the past 24 hrs:   BP Temp Pulse Resp SpO2   04/12/19 0514 130/65 98 °F (36.7 °C) 65 16 92 %   04/11/19 2108 115/72 97.4 °F (36.3 °C) (!) 57 16 94 %   04/11/19 1748 126/68 -- 64 -- --   04/11/19 0821 119/70 97.9 °F (36.6 °C) 81 16 95 %       General: alert, cooperative, no distress, appears stated age   Wound: Wound clean and dry no evidence of infection. , No Erythema, No Edema and No Drainage   Motion: Extension: Full Extension   DVT Exam: No evidence of DVT seen on physical exam.  Negative Alicia's sign. No cords or calf tenderness. No significant calf/ankle edema. Data Review:  No results found for this or any previous visit (from the past 24 hour(s)). Assessment:     Status Post left thigh I & D Doing well postoperatively. . Bandage packing, 4x4s, ABD; clean this AM. Labs stable. Up ad shaunna without issuesw. Pain control WNL. Plan:     Continues current post-op course  Continue PT per protocol  Awaiting further cultures results, will follow with Dr. Lauri Lynch; possible discharge with PICC.

## 2019-04-12 NOTE — PROGRESS NOTES
Bedside and Verbal shift change report given to Ashutosh Dumont (oncoming nurse) by Griselda Tatum and Emma Grady (offgoing nurse). Report included the following information SBAR, Kardex, OR Summary, Intake/Output and MAR.

## 2019-04-12 NOTE — PROGRESS NOTES
Infectious Diseases Progress Note    Antibiotic Summary:  Ancef  4/10 --   Zosyn   -- present    I&D distal left thigh on 4/10/2019    Subjective:     She is comfortable with no pain at rest    Objective:     Vitals:   Visit Vitals  /72 (BP 1 Location: Right arm, BP Patient Position: At rest)   Pulse (!) 57   Temp 97.4 °F (36.3 °C)   Resp 16   Ht 5' 6\" (1.676 m)   Wt 109.8 kg (242 lb)   SpO2 94%   BMI 39.06 kg/m²        Tmax:  Temp (24hrs), Av.5 °F (36.4 °C), Min:97.3 °F (36.3 °C), Max:97.9 °F (36.6 °C)      Exam:  General appearance: alert, no distress  Lungs: clear to auscultation bilaterally  Heart: regular rate and rhythm  Abdomen: soft, non-tender. Bowel sounds normal.     IV Lines: peripheral    Labs:    Recent Labs     04/10/19  1251 19  1226   WBC  --  7.5   HGB 13.6 14.5   PLT  --  208   BUN 27* 27*   CREA 0.93 0.98     Preadmission wound cultures = Serratia liquefaciens complex    Intraop cultures 4/10 = NGSF    Assessment:     1. Indolent infection distal left thigh S/P ORIF of an open comminuted fracture of the distal left femur in Dec 2017 -- S/P surgical I&D on 4/10/2019 with removal of a foreign body deep in the wound: Difficult problem as she likely has infection of the hardware and fracture site. The bone has not healed enough to allow removal of the hardware.     2. HTN     3. Obesity      Plan:     1.  Maite Lazaro MD

## 2019-04-12 NOTE — PROGRESS NOTES
Chart reviewed. CM met with patient to discuss home infusion options and home health. Offered freedom of choice, patient prefers [facility] and [facility] for home health. CM sent referral to [facility] and [facility] via [employer] to check patient's insurance benefits for home IV abx. Noted cultures are still pending. Cre management will continue to follow.     AMADO Shultz/ADRIEL

## 2019-04-13 PROCEDURE — 74011250637 HC RX REV CODE- 250/637: Performed by: PHYSICIAN ASSISTANT

## 2019-04-13 PROCEDURE — 65270000029 HC RM PRIVATE

## 2019-04-13 PROCEDURE — 74011250636 HC RX REV CODE- 250/636: Performed by: INTERNAL MEDICINE

## 2019-04-13 PROCEDURE — 77030028907 HC WRP KNEE WO BGS SOLM -B

## 2019-04-13 PROCEDURE — 74011000258 HC RX REV CODE- 258: Performed by: INTERNAL MEDICINE

## 2019-04-13 RX ORDER — CLONIDINE HYDROCHLORIDE 0.1 MG/1
0.1 TABLET ORAL
Status: DISCONTINUED | OUTPATIENT
Start: 2019-04-14 | End: 2019-04-16 | Stop reason: HOSPADM

## 2019-04-13 RX ADMIN — ONDANSETRON 4 MG: 4 TABLET, ORALLY DISINTEGRATING ORAL at 16:29

## 2019-04-13 RX ADMIN — Medication 10 ML: at 06:22

## 2019-04-13 RX ADMIN — CLONIDINE HYDROCHLORIDE 0.1 MG: 0.1 TABLET ORAL at 19:09

## 2019-04-13 RX ADMIN — PIPERACILLIN SODIUM,TAZOBACTAM SODIUM 3.38 G: 3; .375 INJECTION, POWDER, FOR SOLUTION INTRAVENOUS at 23:18

## 2019-04-13 RX ADMIN — CHLORTHALIDONE 25 MG: 25 TABLET ORAL at 08:38

## 2019-04-13 RX ADMIN — AMLODIPINE BESYLATE 10 MG: 5 TABLET ORAL at 08:38

## 2019-04-13 RX ADMIN — PIPERACILLIN SODIUM,TAZOBACTAM SODIUM 3.38 G: 3; .375 INJECTION, POWDER, FOR SOLUTION INTRAVENOUS at 06:21

## 2019-04-13 RX ADMIN — Medication 10 ML: at 14:00

## 2019-04-13 RX ADMIN — PIPERACILLIN SODIUM,TAZOBACTAM SODIUM 3.38 G: 3; .375 INJECTION, POWDER, FOR SOLUTION INTRAVENOUS at 15:36

## 2019-04-13 RX ADMIN — ONDANSETRON 4 MG: 4 TABLET, ORALLY DISINTEGRATING ORAL at 20:19

## 2019-04-13 NOTE — PROGRESS NOTES
Bedside shift change report given to Jose Verdugo (oncoming nurse) by Gideon Vogt (offgoing nurse). Report included the following information SBAR, Kardex, Intake/Output, MAR and Recent Results.

## 2019-04-13 NOTE — PROGRESS NOTES
Infectious Diseases Progress Note    Antibiotic Summary:  Ancef  4/10 --   Zosyn   -- present    I&D distal left thigh on 4/10/2019    Subjective:     No complaints    Objective:     Vitals:   Visit Vitals  /70 (BP 1 Location: Right arm, BP Patient Position: At rest)   Pulse 60   Temp 97.8 °F (36.6 °C)   Resp 15   Ht 5' 6\" (1.676 m)   Wt 109.8 kg (242 lb)   SpO2 95%   BMI 39.06 kg/m²        Tmax:  Temp (24hrs), Av °F (36.7 °C), Min:97.6 °F (36.4 °C), Max:98.7 °F (37.1 °C)      Exam:  General appearance: alert, no distress  Lungs: clear to auscultation bilaterally  Heart: regular rate and rhythm  Abdomen: soft, non-tender. Bowel sounds normal.   Left thigh: dressed    IV Lines: peripheral    Labs:    Recent Labs     04/10/19  1251   HGB 13.6   BUN 27*   CREA 0.93     Preadmission wound cultures left thigh = Serratia liquefaciens complex    Intraop cultures left thigh 4/10 = NGSF    Assessment:     1. Indolent infection distal left thigh S/P ORIF of an open comminuted fracture of the distal left femur in Dec 2017 -- S/P surgical I&D on 4/10/2019 with removal of a foreign body deep in the wound: Difficult problem as she likely has infection of the hardware and fracture site. The bone has not healed enough to allow removal of the hardware.     2. HTN     3. Obesity      Plan:     1. Continue Zosyn    2.  She may prefer going to the [facility]

## 2019-04-13 NOTE — PROGRESS NOTES
Bedside and Verbal shift change report given to Rufino Lombardi RN (oncoming nurse) by Zahraa Sanches RN (offgoing nurse). Report included the following information SBAR, Kardex, Procedure Summary, Intake/Output, MAR and Accordion.

## 2019-04-14 LAB
ANION GAP SERPL CALC-SCNC: 7 MMOL/L (ref 5–15)
BUN SERPL-MCNC: 17 MG/DL (ref 6–20)
BUN/CREAT SERPL: 18 (ref 12–20)
CALCIUM SERPL-MCNC: 9.6 MG/DL (ref 8.5–10.1)
CHLORIDE SERPL-SCNC: 102 MMOL/L (ref 97–108)
CO2 SERPL-SCNC: 30 MMOL/L (ref 21–32)
CREAT SERPL-MCNC: 0.95 MG/DL (ref 0.55–1.02)
GLUCOSE SERPL-MCNC: 133 MG/DL (ref 65–100)
POTASSIUM SERPL-SCNC: 3.1 MMOL/L (ref 3.5–5.1)
SODIUM SERPL-SCNC: 139 MMOL/L (ref 136–145)

## 2019-04-14 PROCEDURE — 80048 BASIC METABOLIC PNL TOTAL CA: CPT

## 2019-04-14 PROCEDURE — 74011000250 HC RX REV CODE- 250: Performed by: PHYSICIAN ASSISTANT

## 2019-04-14 PROCEDURE — 74011000258 HC RX REV CODE- 258: Performed by: INTERNAL MEDICINE

## 2019-04-14 PROCEDURE — 74011250636 HC RX REV CODE- 250/636: Performed by: PHYSICIAN ASSISTANT

## 2019-04-14 PROCEDURE — 74011250636 HC RX REV CODE- 250/636: Performed by: INTERNAL MEDICINE

## 2019-04-14 PROCEDURE — 74011250637 HC RX REV CODE- 250/637: Performed by: ORTHOPAEDIC SURGERY

## 2019-04-14 PROCEDURE — 65270000029 HC RM PRIVATE

## 2019-04-14 PROCEDURE — 36415 COLL VENOUS BLD VENIPUNCTURE: CPT

## 2019-04-14 RX ORDER — ONDANSETRON 2 MG/ML
4 INJECTION INTRAMUSCULAR; INTRAVENOUS
Status: DISCONTINUED | OUTPATIENT
Start: 2019-04-14 | End: 2019-04-16 | Stop reason: HOSPADM

## 2019-04-14 RX ORDER — SODIUM CHLORIDE 9 MG/ML
75 INJECTION, SOLUTION INTRAVENOUS CONTINUOUS
Status: DISCONTINUED | OUTPATIENT
Start: 2019-04-14 | End: 2019-04-15

## 2019-04-14 RX ADMIN — CLONIDINE HYDROCHLORIDE 0.1 MG: 0.1 TABLET ORAL at 21:41

## 2019-04-14 RX ADMIN — SODIUM CHLORIDE 10 MG: 9 INJECTION INTRAMUSCULAR; INTRAVENOUS; SUBCUTANEOUS at 13:23

## 2019-04-14 RX ADMIN — SODIUM CHLORIDE 75 ML/HR: 900 INJECTION, SOLUTION INTRAVENOUS at 09:40

## 2019-04-14 RX ADMIN — MEROPENEM 500 MG: 500 INJECTION, POWDER, FOR SOLUTION INTRAVENOUS at 07:17

## 2019-04-14 RX ADMIN — MEROPENEM 500 MG: 500 INJECTION, POWDER, FOR SOLUTION INTRAVENOUS at 14:18

## 2019-04-14 RX ADMIN — ONDANSETRON 4 MG: 2 INJECTION INTRAMUSCULAR; INTRAVENOUS at 19:16

## 2019-04-14 RX ADMIN — ONDANSETRON 4 MG: 2 INJECTION INTRAMUSCULAR; INTRAVENOUS at 09:43

## 2019-04-14 RX ADMIN — Medication 10 ML: at 07:17

## 2019-04-14 RX ADMIN — MEROPENEM 500 MG: 500 INJECTION, POWDER, FOR SOLUTION INTRAVENOUS at 19:49

## 2019-04-14 NOTE — PROGRESS NOTES
Infectious Diseases Progress Note    Antibiotic Summary:  Ancef  4/10 --   Zosyn   --   Meropenem To begin on      I&D distal left thigh on 4/10/2019    Subjective:     Nausea all day today and cannot eat    Objective:     Vitals:   Visit Vitals  /83 (BP 1 Location: Right arm, BP Patient Position: At rest) Comment: Just returned from bathroom; had an episode of emesis    Pulse 76   Temp 98 °F (36.7 °C)   Resp 20   Ht 5' 6\" (1.676 m)   Wt 109.8 kg (242 lb)   SpO2 96%   BMI 39.06 kg/m²        Tmax:  Temp (24hrs), Av.9 °F (36.6 °C), Min:97.6 °F (36.4 °C), Max:98 °F (36.7 °C)      Exam:  General appearance: alert, no distress  Lungs: clear to auscultation bilaterally  Heart: regular rate and rhythm  Abdomen: soft, non-tender. Bowel sounds normal.   Left thigh: dressed    IV Lines: peripheral    Labs:    No results for input(s): WBC, HGB, PLT, NEUT, BUN, CREA, TBIL, TBILI, SGOT, AP, HGBEXT, PLTEXT, HGBEXT, PLTEXT in the last 72 hours. Preadmission wound cultures left thigh = Serratia liquefaciens complex    Intraop cultures left thigh 4/10 = NGSF    Assessment:     1. Indolent infection distal left thigh S/P ORIF of an open comminuted fracture of the distal left femur in Dec 2017 -- S/P surgical I&D on 4/10/2019 with removal of a foreign body deep in the wound: Difficult problem as she likely has infection of the hardware and fracture site. The bone has not healed enough to allow removal of the hardware. Nausea may be due to Zosyn -- will change to Meropenem     2. HTN     3. Obesity      Plan:     1. Discontinue Zosyn -- begin Meropenem    2.  She may prefer going to the [facility] MD

## 2019-04-14 NOTE — PROGRESS NOTES
Bedside shift change report given to Jose Verdugo (oncoming nurse) by Jordan Hall (offgoing nurse). Report included the following information SBAR, Kardex, Intake/Output, MAR and Recent Results.

## 2019-04-14 NOTE — PROGRESS NOTES
Bedside and Verbal shift change report given to Maria Dolores Mcdonough RN (oncoming nurse) by Dale Vega RN (offgoing nurse). Report included the following information SBAR, Kardex and MAR.

## 2019-04-14 NOTE — PROGRESS NOTES
Orthopedic Total Knee Progress Note    2019  Admit Date: 4/10/2019  Admit Diagnosis: Abscess of left thigh [L02.416]    Post Op day: 4 Days Post-Op s/p I and D of left thigh     Subjective:     She has not tolerated the iv antibiotic and Dr. Clarisse Sagastume changed it this morning. Trying IV Zofran and now IV compazine for n/v    Review of Systems: Pertinent items are noted in HPI. Objective:     Vital Signs:    Patient Vitals for the past 8 hrs:   BP Temp Pulse Resp SpO2   19 0814 163/82 98 °F (36.7 °C) 76 18 91 %     Temp (24hrs), Av °F (36.7 °C), Min:97.9 °F (36.6 °C), Max:98.1 °F (36.7 °C)     IV line periphereal     Intraop cultures left thigh on 4/10:  NGSF     LAB:    No results for input(s): HCT, HGB, INR, HCTEXT, HGBEXT in the last 72 hours. No lab exists for component: INREXT      Physical Exam:    Patient resting - n/v continues today   Vitals stable   Lab:   K - - 3/.1  - likely due to dehydration   Assessment:     Principal Problem:    Infection of left prepatellar bursa (4/10/2019)    Active Problems:    Abscess of left thigh (4/10/2019)        Plan:     Continue per infectious disease -   IV antiobiotics.        Signed By: Kishor Solo PA-C

## 2019-04-15 PROCEDURE — 74011250636 HC RX REV CODE- 250/636: Performed by: INTERNAL MEDICINE

## 2019-04-15 PROCEDURE — 74011000258 HC RX REV CODE- 258: Performed by: INTERNAL MEDICINE

## 2019-04-15 PROCEDURE — 74011250637 HC RX REV CODE- 250/637: Performed by: ORTHOPAEDIC SURGERY

## 2019-04-15 PROCEDURE — 65270000029 HC RM PRIVATE

## 2019-04-15 PROCEDURE — 74011250637 HC RX REV CODE- 250/637: Performed by: PHYSICIAN ASSISTANT

## 2019-04-15 PROCEDURE — 74011250636 HC RX REV CODE- 250/636: Performed by: PHYSICIAN ASSISTANT

## 2019-04-15 RX ADMIN — CLONIDINE HYDROCHLORIDE 0.1 MG: 0.1 TABLET ORAL at 21:23

## 2019-04-15 RX ADMIN — MEROPENEM 500 MG: 500 INJECTION, POWDER, FOR SOLUTION INTRAVENOUS at 20:30

## 2019-04-15 RX ADMIN — MEROPENEM 500 MG: 500 INJECTION, POWDER, FOR SOLUTION INTRAVENOUS at 02:16

## 2019-04-15 RX ADMIN — Medication 10 ML: at 07:00

## 2019-04-15 RX ADMIN — ASPIRIN 81 MG CHEWABLE TABLET 81 MG: 81 TABLET CHEWABLE at 21:23

## 2019-04-15 RX ADMIN — SODIUM CHLORIDE 75 ML/HR: 900 INJECTION, SOLUTION INTRAVENOUS at 01:04

## 2019-04-15 RX ADMIN — MEROPENEM 500 MG: 500 INJECTION, POWDER, FOR SOLUTION INTRAVENOUS at 07:00

## 2019-04-15 RX ADMIN — MEROPENEM 500 MG: 500 INJECTION, POWDER, FOR SOLUTION INTRAVENOUS at 15:47

## 2019-04-15 RX ADMIN — AMLODIPINE BESYLATE 10 MG: 5 TABLET ORAL at 09:39

## 2019-04-15 RX ADMIN — CHLORTHALIDONE 25 MG: 25 TABLET ORAL at 09:39

## 2019-04-15 RX ADMIN — Medication 10 ML: at 22:00

## 2019-04-15 RX ADMIN — Medication 10 ML: at 14:00

## 2019-04-15 RX ADMIN — PRAVASTATIN SODIUM 40 MG: 20 TABLET ORAL at 21:23

## 2019-04-15 NOTE — PROGRESS NOTES
0020  Spoke to Dr. Guerda Nicholas about pt having vomiting episodes and not being able to eat or drink in the last two days. MD said he recommended a consult to GI and that he would put one in.

## 2019-04-15 NOTE — PROGRESS NOTES
Infectious Diseases Progress Note    Antibiotic Summary:  Ancef  4/10 --   Zosyn   --   Meropenem  -- present    I&D distal left thigh on 4/10/2019    Subjective:     Despite changing antibiotics, the nausea is no better today    Objective:     Vitals:   Visit Vitals  /83 (BP 1 Location: Right arm, BP Patient Position: At rest)   Pulse 72   Temp 97.8 °F (36.6 °C)   Resp 17   Ht 5' 6\" (1.676 m)   Wt 109.8 kg (242 lb)   SpO2 92%   BMI 39.06 kg/m²        Tmax:  Temp (24hrs), Av °F (36.7 °C), Min:97.8 °F (36.6 °C), Max:98.1 °F (36.7 °C)      Exam:  General appearance: alert, no distress  Lungs: clear to auscultation bilaterally  Heart: regular rate and rhythm  Abdomen: soft, non-tender. Bowel sounds normal.   Left thigh: dressed    IV Lines: peripheral    Labs:    Recent Labs     19  0540   BUN 17   CREA 0.95     Preadmission wound cultures left thigh = Serratia liquefaciens complex    Intraop cultures left thigh 4/10 = NGSF    Assessment:     1. Indolent infection distal left thigh S/P ORIF of an open comminuted fracture of the distal left femur in Dec 2017 -- S/P surgical I&D on 4/10/2019 with removal of a foreign body deep in the wound: Difficult problem as she likely has infection of the hardware and fracture site. The bone has not healed enough to allow removal of the hardware. Nausea did not improve with changing Zosyn to Meropenem    2. Nausea: She is not able to eat. If no better in AM would ask GI to see     3. HTN     4. Obesity      Plan:     1. Continue Meropenem    2. She may prefer going to the [facility]    3.  If nausea is no better on 4/15, would consider GI evaluation      Jennifer Talamantes MD

## 2019-04-15 NOTE — PROGRESS NOTES
Bedside and Verbal shift change report given to Salvador Wynn RN (oncoming nurse) by Yahir Alexander RN (offgoing nurse). Report included the following information SBAR, Kardex and MAR.

## 2019-04-15 NOTE — PROGRESS NOTES
Dressing change completed, a scant amount of drainage was present on the ABD pad. The packing had a small amount of serosanguinous drainage on it. The tissue looked pink and healthy. Wound was redressed and patient tolerated procedure very well.

## 2019-04-15 NOTE — PROGRESS NOTES
Patient still has intermittent nausea and vomiting; pageelmer bill; spoke to Kip NAVA about patient's N/V; received orders for 75ml/hr NS IV and IV zofran PRN Q4; orders placed    approx 1300: patient is still vomiting after sips of liquids; paged Kip NAVA again; received one time dose of compazine 10mg IV; order placed; will continue to monitor    Patient slept for most of afternoon with no vomiting; approx 1900 patient vomited again; IV zofran given; will continue to monitor

## 2019-04-15 NOTE — PROGRESS NOTES
Bedside and Verbal shift change report given to LIZZY Lynn (oncoming nurse) by Lara Thomas RN (offgoing nurse). Report included the following information SBAR, Kardex, ED Summary, OR Summary, Procedure Summary, Intake/Output, MAR and Recent Results.

## 2019-04-15 NOTE — PROGRESS NOTES
ORTHO POSTOP PROGRESS NOTE      Subjective:     Post-Operative Day: 5 Status Post left thigh I & D  Systemic or Specific Complaints:No Complaints    Objective:     Patient Vitals for the past 24 hrs:   BP Temp Pulse Resp SpO2   04/15/19 0227 165/83 97.7 °F (36.5 °C) 75 16 95 %   04/14/19 2034 174/83 97.8 °F (36.6 °C) 72 17 --   04/14/19 1345 162/80 -- 80 18 92 %       General: alert, cooperative, no distress, appears stated age   Wound: Wound clean and dry no evidence of infection. , No Erythema and No Edema   Motion: Extension: Full Extension   DVT Exam: No evidence of DVT seen on physical exam.  Negative Alicia's sign. No cords or calf tenderness. No significant calf/ankle edema. Data Review:  No results found for this or any previous visit (from the past 24 hour(s)). Assessment:     Status Post left thigh I & D. Doing well postoperatively. Cultures remain negative. Wound healing in based on nursing notes. Ambulating ad shaunna without issues. Nausea and vomitting over the weekend.   She is feeling better this AM.    Plan:     May discontinue use of knee immobilizer for ambulation  See if she tolerates food for lunch, possible GI consult but patient defers this AM.  Continue wound care  Continues current post-op course

## 2019-04-15 NOTE — PROGRESS NOTES
Chart reviewed. Noted ID plan is pending. CM sent referral to [facility] on Friday to check patient's insurance benefits for home IV abx. Per [facility], patient's Medicare part D is inactive and her BCBS Mickiel Ace is a supplement. They will be able to calculate cost based on final orders. Noted per ID note, patient may prefer going to the [address] [place] [address]. Noted possible GI consult if patient is unable to tolerate lunch. CM will follow for final ID plan/discharge planning.     AMADO Munoz/ADRIEL

## 2019-04-16 VITALS
RESPIRATION RATE: 16 BRPM | WEIGHT: 242 LBS | BODY MASS INDEX: 38.89 KG/M2 | OXYGEN SATURATION: 97 % | HEART RATE: 68 BPM | SYSTOLIC BLOOD PRESSURE: 130 MMHG | DIASTOLIC BLOOD PRESSURE: 72 MMHG | HEIGHT: 66 IN | TEMPERATURE: 97.4 F

## 2019-04-16 PROCEDURE — 74011250637 HC RX REV CODE- 250/637: Performed by: PHYSICIAN ASSISTANT

## 2019-04-16 PROCEDURE — C1751 CATH, INF, PER/CENT/MIDLINE: HCPCS

## 2019-04-16 PROCEDURE — 77030020847 HC STATLOK BARD -A

## 2019-04-16 PROCEDURE — 76937 US GUIDE VASCULAR ACCESS: CPT

## 2019-04-16 PROCEDURE — 74011000258 HC RX REV CODE- 258: Performed by: INTERNAL MEDICINE

## 2019-04-16 PROCEDURE — 74011250636 HC RX REV CODE- 250/636: Performed by: INTERNAL MEDICINE

## 2019-04-16 PROCEDURE — 77030018719 HC DRSG PTCH ANTIMIC J&J -A

## 2019-04-16 PROCEDURE — 36569 INSJ PICC 5 YR+ W/O IMAGING: CPT | Performed by: INTERNAL MEDICINE

## 2019-04-16 RX ADMIN — ERTAPENEM SODIUM 1 G: 1 INJECTION, POWDER, LYOPHILIZED, FOR SOLUTION INTRAMUSCULAR; INTRAVENOUS at 15:05

## 2019-04-16 RX ADMIN — MEROPENEM 500 MG: 500 INJECTION, POWDER, FOR SOLUTION INTRAVENOUS at 02:00

## 2019-04-16 RX ADMIN — MEROPENEM 500 MG: 500 INJECTION, POWDER, FOR SOLUTION INTRAVENOUS at 07:02

## 2019-04-16 RX ADMIN — CHLORTHALIDONE 25 MG: 25 TABLET ORAL at 10:09

## 2019-04-16 RX ADMIN — Medication 10 ML: at 15:05

## 2019-04-16 RX ADMIN — Medication 10 ML: at 07:02

## 2019-04-16 RX ADMIN — AMLODIPINE BESYLATE 10 MG: 5 TABLET ORAL at 10:09

## 2019-04-16 NOTE — PROGRESS NOTES
Bedside and Verbal shift change report given to Doris (oncoming nurse) by Som Mccauley  (offgoing nurse). Report included the following information SBAR, Kardex, Procedure Summary, Intake/Output, MAR and Recent Results.

## 2019-04-16 NOTE — PROGRESS NOTES
Chart reviewed. NATASHA spoke with Dr. Alex Henry. The plan will be for patient to go to the [facility] for IV abx. CM called the [facility] and left voicemail message to find out if they have any openings tomorrow. CM awaiting response. Plan is for PICC line today. CM will continue to follow. 2:00 PM: NATASHA spoke with Bonita Avalos at the [facility]. They have an opening tomorrow at 2:30 at the [facility]. Dr. Alex Henry is aware. CM faxed [facility] form to the [facility] at [phone]. Patient is getting a PICC line now. The patient will not have home health as the plan is now for the [facility] and patient is not homebound. NATASHA spoke with Bonita Avalos at the [facility]. She is scheduling patient for infusion times at the [facility] starting tomorrow 4/17. All [facility] appointments are listed in the AVS.    Spoke with Kip Ziegler. Dr. Dorian Gudino is aware and ok with no home health. AMADO Jon. CRM

## 2019-04-16 NOTE — PROGRESS NOTES
Infectious Diseases Progress Note    Antibiotic Summary:  Ancef  4/10 --   Zosyn   --   Meropenem  -- present    I&D distal left thigh on 4/10/2019    Subjective:     She feels better today. Nausea improved and no vomiting but took in only liquids. No abd pain. Objective:     Vitals:   Visit Vitals  /73 (BP 1 Location: Left arm)   Pulse 67   Temp 97.6 °F (36.4 °C)   Resp 16   Ht 5' 6\" (1.676 m)   Wt 109.8 kg (242 lb)   SpO2 97%   BMI 39.06 kg/m²        Tmax:  Temp (24hrs), Av.5 °F (36.4 °C), Min:97.3 °F (36.3 °C), Max:97.7 °F (36.5 °C)      Exam:  General appearance: alert, no distress  Lungs: clear to auscultation bilaterally  Heart: regular rate and rhythm  Abdomen: soft, non-tender. Bowel sounds normal.   Left thigh: dressed    IV Lines: peripheral    Labs:    Recent Labs     19  0540   BUN 17   CREA 0.95     Preadmission wound cultures left thigh = Serratia liquefaciens complex    Intraop cultures left thigh 4/10 = NGSF    Assessment:     1. Indolent infection distal left thigh S/P ORIF of an open comminuted fracture of the distal left femur in Dec 2017 -- S/P surgical I&D on 4/10/2019 with removal of a foreign body deep in the wound: Difficult problem as she likely has infection of the hardware and fracture site. The bone has not healed enough to allow removal of the hardware. Nausea better today    2. Nausea: Better today     3. HTN     4. Obesity      Plan:     1. Continue Meropenem    2. She may prefer going to the [facility]    3. If nausea resolves we could consider change to Invanz and arrange Rx in the [facility].       Jennifer Talamantes MD

## 2019-04-16 NOTE — PROGRESS NOTES
Bedside and Verbal shift change report given to Leah RN (oncoming nurse) by Yogesh Horton RN (offgoing nurse). Report included the following information SBAR, Kardex and MAR.

## 2019-04-16 NOTE — PROGRESS NOTES
ORTHO POSTOP PROGRESS NOTE      Subjective:     Post-Operative Day: 6 Status Post left medial thigh I & D  Systemic or Specific Complaints:No Complaints    Objective:     Patient Vitals for the past 24 hrs:   BP Temp Pulse Resp SpO2   04/16/19 1007 148/81 98 °F (36.7 °C) 62 16 97 %   04/16/19 0332 (!) 153/93 97.8 °F (36.6 °C) 78 16 92 %   04/15/19 2039 139/73 97.6 °F (36.4 °C) 67 16 --   04/15/19 1517 149/77 97.3 °F (36.3 °C) 69 16 97 %       General: alert, cooperative, no distress, appears stated age   Wound: Wound clean and dry no evidence of infection. , No Erythema and No Edema   Motion: Extension: Full Extension   DVT Exam: No evidence of DVT seen on physical exam.  Negative Alicia's sign. No cords or calf tenderness. No significant calf/ankle edema. Data Review:  No results found for this or any previous visit (from the past 24 hour(s)). Assessment:     Status Post left medial thigh I & D. Doing well postoperatively. Ambulating ad shaunna. No pain. Wound shows no erythema, scant drainage on ABD. Packing removed, very little packing present; wound granulating well. Plan:     Educate family on dressing changes. Continues current post-op course  Plan to discharge to Home, will get IV abx with [facility].

## 2019-04-16 NOTE — DISCHARGE INSTRUCTIONS
DC Orders All Total Knees    Case Management for DC planning to [facility] .              - Home dressing changes daily, re-pack as needed for the next 2- 3 days.   - Follow up in Office in 1 week              - Suture removal per surgical team    After [address]:  Discharge Instructions Knee Replacement-Dr. Catina Rodriguez    Patient Name: Tunde Weathers  Date of procedure: 4/10/2019   Procedure: Procedure(s):  INCISION AND DRAINAGE LEFT PREPATELLAR BURSA  Surgeon: Surgeon(s) and Role:     Anne Lange MD - Primary    PCP: Romi Hdez MD  Date of discharge: No discharge date for patient encounter. Follow up appointments   Follow up with Dr. Catina Rodriguez in 1 week. Call [phone] to make an appointment. When to call your Orthopaedic Surgeon: Call [phone]. If you call after 5pm or on a weekend, the on call physician will be contacted   Unrelieved pain   Signs of infection-if your incision is red; continues to have drainage; drainage has a foul odor or if you have a persistent fever over 101 degrees   Signs of a blood clot in your leg-calf pain, tenderness, redness, swelling of lower leg    When to call your Primary Care Physician:   Concerns about medical conditions such as diabetes, high blood pressure, asthma, congestive heart failure   Call if blood sugars are elevated, persistent headache or dizziness, coughing or congestion, constipation or diarrhea, burning with urination, abnormal heart rate    When to call [id number] go to the nearest emergency room   Acute onset of chest pain, shortness of breath, difficulty breathing    Activity   Weight bearing as tolerated, use of knee immobilizer as needed.     Incision Care   Wound care for wound packing and dressing every other day    Pain management   Keep ice wrap in place except when walking; changing gel packs every 4 hours   Lie down and elevate your leg on pillows for about 30 minutes after walking to decrease swelling and pain  Do ankle pumps (10 repetitions) every hour while awake and get up frequently to walk    Take Tylenol 500mg every 6 hours for 2 weeks    Take narcotic pain pill as prescribed if needed. Take with food; avoid alcohol while taking pain medication. Decrease the amount of narcotic pain medication as your pain lessens     Diet   Resume usual diet; drink plenty of fluids; eat foods high in fiber   You may want to take a stool softener (such as Senokot-S or Colace) to prevent constipation while you are taking pain medication.   If constipation occurs, take a laxative (such as Dulcolax tablets, Milk of Magnesia, or a suppository)

## 2019-04-16 NOTE — PROCEDURES
PICC Placement Note    PRE-PROCEDURE VERIFICATION    Order for PICC verified. Consent obtained from patient after risks, benefits, and procedure was explained. Time given to answer questions and/or concerns. Correct Procedure: yes  Correct Site:  yes  Temperature: Temp: 98 °F (36.7 °C), Temperature Source: Temp Source: Oral  Recent Labs     04/14/19  0540   BUN 17   CREA 0.95     Allergies: Egg; Sulfa (sulfonamide antibiotics); Chlorhexidine towelette; and Adhesive  Education materials, including PICC Booklet, for PICC Care given to patient: yes. See Patient Education activity for further details. PROCEDURE DETAIL  A double lumen PICC line was started for antibiotic therapy, desire for reliable access and nurse/physician request. The following documentation is in addition to the PICC properties in the lines/airways flowsheet :  Lot #: [id number]  Was xylocaine 1% used intradermally:  yes  Catheter Length: 40 cm  External Catheter Length: 0 cm  Vein Selection for PICC:right cephalic  Central Line Bundle followed yes  Complication Related to Insertion: none    The placement was verified by ECG:  The tip location is in the superior vena cava. See ECG results for PICC tip placement. Report given to LIZZY Lynn. Line is okay to use.     DENISE Lockett, RN, [place]-BC  Vascular Access Team

## 2019-04-17 ENCOUNTER — HOSPITAL ENCOUNTER (OUTPATIENT)
Dept: INFUSION THERAPY | Age: 79
Discharge: HOME | End: 2019-04-17
Payer: MEDICARE

## 2019-04-17 VITALS
TEMPERATURE: 97.7 F | HEART RATE: 65 BPM | RESPIRATION RATE: 18 BRPM | DIASTOLIC BLOOD PRESSURE: 72 MMHG | SYSTOLIC BLOOD PRESSURE: 149 MMHG

## 2019-04-17 PROCEDURE — 74011250636 HC RX REV CODE- 250/636: Performed by: INTERNAL MEDICINE

## 2019-04-17 PROCEDURE — 96365 THER/PROPH/DIAG IV INF INIT: CPT

## 2019-04-17 PROCEDURE — 74011000258 HC RX REV CODE- 258: Performed by: INTERNAL MEDICINE

## 2019-04-17 RX ORDER — HEPARIN 100 UNIT/ML
500 SYRINGE INTRAVENOUS AS NEEDED
Status: DISCONTINUED | OUTPATIENT
Start: 2019-04-17 | End: 2019-04-18 | Stop reason: HOSPADM

## 2019-04-17 RX ORDER — SODIUM CHLORIDE 0.9 % (FLUSH) 0.9 %
5-10 SYRINGE (ML) INJECTION AS NEEDED
Status: DISCONTINUED | OUTPATIENT
Start: 2019-04-17 | End: 2019-04-18 | Stop reason: HOSPADM

## 2019-04-17 RX ADMIN — ERTAPENEM SODIUM 1 G: 1 INJECTION, POWDER, LYOPHILIZED, FOR SOLUTION INTRAMUSCULAR; INTRAVENOUS at 15:20

## 2019-04-17 RX ADMIN — Medication 10 ML: at 15:52

## 2019-04-17 RX ADMIN — Medication 500 UNITS: at 15:53

## 2019-04-17 NOTE — PROGRESS NOTES
Outpatient Infusion Center Progress Note 1510 Pt admit to [facility] for daily Invanz. Pt ambulatory in stable condition. Supportive spouse at chairside. Assessment completed. Oriented to [facility] and treatment plans going forward including lab draws and PICC line care. Pt verbalized understanding. No concerns voiced. Single lumen PICC line flushes with positive blood return. Visit Vitals /72 Pulse 65 Temp 97.7 °F (36.5 °C) Resp 18 Medications: 
Invanz IV 1 GM 
 
1657 Pt tolerated treatment well. PICC line flushed, heparinized, and capped for tomorrow's infusion. D/c home ambulatory in no distress. Pt aware of next appointment scheduled for 04/18/19 at 1400.

## 2019-04-18 ENCOUNTER — HOSPITAL ENCOUNTER (OUTPATIENT)
Dept: INFUSION THERAPY | Age: 79
Discharge: HOME | End: 2019-04-18
Payer: MEDICARE

## 2019-04-18 VITALS
TEMPERATURE: 97.2 F | SYSTOLIC BLOOD PRESSURE: 124 MMHG | DIASTOLIC BLOOD PRESSURE: 56 MMHG | RESPIRATION RATE: 18 BRPM | HEART RATE: 69 BPM

## 2019-04-18 LAB
ALBUMIN SERPL-MCNC: 3 G/DL (ref 3.5–5)
ALBUMIN/GLOB SERPL: 0.8 {RATIO} (ref 1.1–2.2)
ALP SERPL-CCNC: 99 U/L (ref 45–117)
ALT SERPL-CCNC: 25 U/L (ref 12–78)
ANION GAP SERPL CALC-SCNC: 6 MMOL/L (ref 5–15)
AST SERPL-CCNC: 23 U/L (ref 15–37)
BASOPHILS # BLD: 0.1 K/UL (ref 0–0.1)
BASOPHILS NFR BLD: 1 % (ref 0–1)
BILIRUB SERPL-MCNC: 0.8 MG/DL (ref 0.2–1)
BUN SERPL-MCNC: 22 MG/DL (ref 6–20)
BUN/CREAT SERPL: 29 (ref 12–20)
CALCIUM SERPL-MCNC: 8.9 MG/DL (ref 8.5–10.1)
CHLORIDE SERPL-SCNC: 103 MMOL/L (ref 97–108)
CO2 SERPL-SCNC: 31 MMOL/L (ref 21–32)
CREAT SERPL-MCNC: 0.77 MG/DL (ref 0.55–1.02)
DIFFERENTIAL METHOD BLD: ABNORMAL
EOSINOPHIL # BLD: 0.2 K/UL (ref 0–0.4)
EOSINOPHIL NFR BLD: 3 % (ref 0–7)
ERYTHROCYTE [DISTWIDTH] IN BLOOD BY AUTOMATED COUNT: 15 % (ref 11.5–14.5)
GLOBULIN SER CALC-MCNC: 3.8 G/DL (ref 2–4)
GLUCOSE SERPL-MCNC: 113 MG/DL (ref 65–100)
HCT VFR BLD AUTO: 41.7 % (ref 35–47)
HGB BLD-MCNC: 13.4 G/DL (ref 11.5–16)
IMM GRANULOCYTES # BLD AUTO: 0.1 K/UL (ref 0–0.04)
IMM GRANULOCYTES NFR BLD AUTO: 1 % (ref 0–0.5)
LYMPHOCYTES # BLD: 2.3 K/UL (ref 0.8–3.5)
LYMPHOCYTES NFR BLD: 26 % (ref 12–49)
MCH RBC QN AUTO: 28.9 PG (ref 26–34)
MCHC RBC AUTO-ENTMCNC: 32.1 G/DL (ref 30–36.5)
MCV RBC AUTO: 90.1 FL (ref 80–99)
MONOCYTES # BLD: 0.6 K/UL (ref 0–1)
MONOCYTES NFR BLD: 7 % (ref 5–13)
NEUTS SEG # BLD: 5.6 K/UL (ref 1.8–8)
NEUTS SEG NFR BLD: 62 % (ref 32–75)
NRBC # BLD: 0 K/UL (ref 0–0.01)
NRBC BLD-RTO: 0 PER 100 WBC
PLATELET # BLD AUTO: 214 K/UL (ref 150–400)
PMV BLD AUTO: 11.1 FL (ref 8.9–12.9)
POTASSIUM SERPL-SCNC: 2.9 MMOL/L (ref 3.5–5.1)
PROT SERPL-MCNC: 6.8 G/DL (ref 6.4–8.2)
RBC # BLD AUTO: 4.63 M/UL (ref 3.8–5.2)
SODIUM SERPL-SCNC: 140 MMOL/L (ref 136–145)
WBC # BLD AUTO: 8.9 K/UL (ref 3.6–11)

## 2019-04-18 PROCEDURE — 85025 COMPLETE CBC W/AUTO DIFF WBC: CPT

## 2019-04-18 PROCEDURE — 74011250636 HC RX REV CODE- 250/636: Performed by: INTERNAL MEDICINE

## 2019-04-18 PROCEDURE — 74011000258 HC RX REV CODE- 258: Performed by: INTERNAL MEDICINE

## 2019-04-18 PROCEDURE — 36415 COLL VENOUS BLD VENIPUNCTURE: CPT

## 2019-04-18 PROCEDURE — 80053 COMPREHEN METABOLIC PANEL: CPT

## 2019-04-18 PROCEDURE — 96365 THER/PROPH/DIAG IV INF INIT: CPT

## 2019-04-18 RX ADMIN — ERTAPENEM SODIUM 1 G: 1 INJECTION, POWDER, LYOPHILIZED, FOR SOLUTION INTRAMUSCULAR; INTRAVENOUS at 14:01

## 2019-04-18 NOTE — PROGRESS NOTES
Outpatient Infusion Center Progress Note 1400 Pt admit to [facility] for Daily Invanz ambulatory in stable condition. Assessment completed. No new concerns voiced. Labs drawn via PICC and sent for processing. Visit Vitals /56 Pulse 69 Temp 97.2 °F (36.2 °C) Resp 18 Medications: 
Invanz IV 
 
1430 Pt tolerated treatment well. PICC flushed, heparinized and capped. D/c home ambulatory in no distress. Pt aware of next appointment scheduled for 4/19. Recent Results (from the past 8 hour(s)) CBC WITH AUTOMATED DIFF Collection Time: 04/18/19  1:59 PM  
Result Value Ref Range WBC 8.9 3.6 - 11.0 K/uL  
 RBC 4.63 3.80 - 5.20 M/uL  
 HGB 13.4 11.5 - 16.0 g/dL HCT 41.7 35.0 - 47.0 % MCV 90.1 80.0 - 99.0 FL  
 MCH 28.9 26.0 - 34.0 PG  
 MCHC 32.1 30.0 - 36.5 g/dL  
 RDW 15.0 (H) 11.5 - 14.5 % PLATELET 278 687 - 716 K/uL MPV 11.1 8.9 - 12.9 FL  
 NRBC 0.0 0  WBC ABSOLUTE NRBC 0.00 0.00 - 0.01 K/uL NEUTROPHILS 62 32 - 75 % LYMPHOCYTES 26 12 - 49 % MONOCYTES 7 5 - 13 % EOSINOPHILS 3 0 - 7 % BASOPHILS 1 0 - 1 % IMMATURE GRANULOCYTES 1 (H) 0.0 - 0.5 % ABS. NEUTROPHILS 5.6 1.8 - 8.0 K/UL  
 ABS. LYMPHOCYTES 2.3 0.8 - 3.5 K/UL  
 ABS. MONOCYTES 0.6 0.0 - 1.0 K/UL  
 ABS. EOSINOPHILS 0.2 0.0 - 0.4 K/UL  
 ABS. BASOPHILS 0.1 0.0 - 0.1 K/UL  
 ABS. IMM. GRANS. 0.1 (H) 0.00 - 0.04 K/UL  
 DF AUTOMATED METABOLIC PANEL, COMPREHENSIVE Collection Time: 04/18/19  1:59 PM  
Result Value Ref Range Sodium 140 136 - 145 mmol/L Potassium 2.9 (L) 3.5 - 5.1 mmol/L Chloride 103 97 - 108 mmol/L  
 CO2 31 21 - 32 mmol/L Anion gap 6 5 - 15 mmol/L Glucose 113 (H) 65 - 100 mg/dL BUN 22 (H) 6 - 20 MG/DL Creatinine 0.77 0.55 - 1.02 MG/DL  
 BUN/Creatinine ratio 29 (H) 12 - 20 GFR est AA >60 >60 ml/min/1.73m2 GFR est non-AA >60 >60 ml/min/1.73m2 Calcium 8.9 8.5 - 10.1 MG/DL  Bilirubin, total 0.8 0.2 - 1.0 MG/DL  
 ALT (SGPT) 25 12 - 78 U/L  
 AST (SGOT) 23 15 - 37 U/L Alk. phosphatase 99 45 - 117 U/L Protein, total 6.8 6.4 - 8.2 g/dL Albumin 3.0 (L) 3.5 - 5.0 g/dL Globulin 3.8 2.0 - 4.0 g/dL A-G Ratio 0.8 (L) 1.1 - 2.2

## 2019-04-19 ENCOUNTER — HOSPITAL ENCOUNTER (OUTPATIENT)
Dept: INFUSION THERAPY | Age: 79
Discharge: HOME | End: 2019-04-19
Payer: MEDICARE

## 2019-04-19 VITALS
RESPIRATION RATE: 18 BRPM | HEART RATE: 69 BPM | SYSTOLIC BLOOD PRESSURE: 137 MMHG | TEMPERATURE: 98.1 F | DIASTOLIC BLOOD PRESSURE: 64 MMHG

## 2019-04-19 PROCEDURE — 96365 THER/PROPH/DIAG IV INF INIT: CPT

## 2019-04-19 PROCEDURE — 74011000258 HC RX REV CODE- 258: Performed by: INTERNAL MEDICINE

## 2019-04-19 PROCEDURE — 74011250636 HC RX REV CODE- 250/636: Performed by: INTERNAL MEDICINE

## 2019-04-19 PROCEDURE — 74011250636 HC RX REV CODE- 250/636

## 2019-04-19 RX ORDER — SODIUM CHLORIDE 0.9 % (FLUSH) 0.9 %
5-10 SYRINGE (ML) INJECTION AS NEEDED
Status: DISCONTINUED | OUTPATIENT
Start: 2019-04-19 | End: 2019-04-20 | Stop reason: HOSPADM

## 2019-04-19 RX ORDER — HEPARIN 100 UNIT/ML
500 SYRINGE INTRAVENOUS AS NEEDED
Status: DISCONTINUED | OUTPATIENT
Start: 2019-04-19 | End: 2019-04-20 | Stop reason: HOSPADM

## 2019-04-19 RX ADMIN — ERTAPENEM SODIUM 1 G: 1 INJECTION, POWDER, LYOPHILIZED, FOR SOLUTION INTRAMUSCULAR; INTRAVENOUS at 14:50

## 2019-04-19 RX ADMIN — Medication 10 ML: at 14:49

## 2019-04-19 RX ADMIN — Medication 10 ML: at 15:21

## 2019-04-19 RX ADMIN — Medication 500 UNITS: at 15:22

## 2019-04-19 NOTE — DISCHARGE SUMMARY
Discharge Summary    Physician: Brandy Sierra MD    Physician Assistant: Anabell Gonzalez PA-C    Admit Diagnosis:  Abscess of left thigh [L02.416]    Final Diagnosis:   1. Infected hematoma, medial left thigh.     Procedure: Incision and drainage of infected hematoma, medial thigh      Complications: None. History of Present Illness: The patient has recent pain and drainage from the left medial thigh . The patient has a right medial thigh abscess and has exhausted conservative therapy at this time. The patient presents for the above-mentioned procedure. The patient has been cleared from a medical and cardiac standpoint. Past Medical History:  Recorded in the chart. Physical Examination: Also recorded in the chart. Examination of the left knee reveals knee reveals a small opening about the anteromedial aspect of the knee. There appears to a small amount of subcutaneous opening area perhaps into the VMO with a small amount of purulent material. No neck pain. No numbness or paresthesias in the hands. No effusion. No cellulitis or rash. I detect no motor or sensory deficits in the upper extremities. The neurovascular status of the upper extremities is intact. Course in the Hospital:  The patient was admitted to the hospital.  The Pt. Underwent a ncision and drainage of infected hematoma, medial thigh. Postoperatively, the pt. did well. The pt. Remained stable from a medical standpoint. Will be discharged on IV antibiotics to be performed at infusion center. At the time of discharge, the patient's right thight incision appeared with nylon sutures intact. No signs of infection or inflammation noted. The patient will follow up in our office in one week post-operatively.     DC med list:  Discharge Medication List as of 4/16/2019  4:01 PM      CONTINUE these medications which have NOT CHANGED    Details   cloNIDine HCl (CATAPRES) 0.1 mg tablet TAKE 1 TABLET BY MOUTH TWICE DAILY, NormalPlease consider 90 day supplies to promote better adherenceDisp-90 Tab, R-4      chlorthalidone (HYGROTEN) 25 mg tablet TAKE 1 TABLET BY MOUTH ONCE DAILY, Normal, Disp-90 Tab, R-4      pravastatin (PRAVACHOL) 40 mg tablet Take 1 Tab by mouth nightly., Normal, Disp-30 Tab, R-11      amLODIPine (NORVASC) 10 mg tablet TAKE 1 TABLET BY MOUTH EVERY MORNING, Normal, Disp-90 Tab, R-4      aspirin 81 mg chewable tablet Take 81 mg by mouth nightly., Historical Med             Patient Disposition: Home  Followup Care:  Discharge Condition: good  Activity as tolerated  Regular Diet  As directed    Follow-up Information     Follow up With Specialties Details Why Contact Info    Reyes Urias MD Internal Medicine   [address] [place] [address]                Viridiana Morse PA-C

## 2019-04-19 NOTE — PROGRESS NOTES
Outpatient Infusion Center Progress Note 1445 Pt admit to [facility] for Hilaria Kyle ambulatory in stable condition. Assessment completed. No new concerns voiced, single lumen PICC in RA, flushes well, good blood return. Visit Vitals /64 Pulse 69 Temp 98.1 °F (36.7 °C) Resp 18 Medications: 
Invanz IV 
 
1525 Pt tolerated treatment well, PICC flushed and heparinized per protocol,  D/c home ambulatory in no distress. Pt aware of next appointment scheduled for 4/20/19.

## 2019-04-20 ENCOUNTER — HOSPITAL ENCOUNTER (OUTPATIENT)
Dept: INFUSION THERAPY | Age: 79
Discharge: HOME | End: 2019-04-20
Payer: MEDICARE

## 2019-04-20 VITALS
DIASTOLIC BLOOD PRESSURE: 72 MMHG | HEART RATE: 67 BPM | RESPIRATION RATE: 18 BRPM | TEMPERATURE: 97 F | SYSTOLIC BLOOD PRESSURE: 169 MMHG

## 2019-04-20 PROCEDURE — 96365 THER/PROPH/DIAG IV INF INIT: CPT

## 2019-04-20 PROCEDURE — 74011000258 HC RX REV CODE- 258: Performed by: INTERNAL MEDICINE

## 2019-04-20 PROCEDURE — 74011250636 HC RX REV CODE- 250/636: Performed by: INTERNAL MEDICINE

## 2019-04-20 RX ORDER — SODIUM CHLORIDE 0.9 % (FLUSH) 0.9 %
5-10 SYRINGE (ML) INJECTION AS NEEDED
Status: DISCONTINUED | OUTPATIENT
Start: 2019-04-20 | End: 2019-04-21 | Stop reason: HOSPADM

## 2019-04-20 RX ORDER — HEPARIN 100 UNIT/ML
500 SYRINGE INTRAVENOUS AS NEEDED
Status: DISCONTINUED | OUTPATIENT
Start: 2019-04-20 | End: 2019-04-21 | Stop reason: HOSPADM

## 2019-04-20 RX ADMIN — Medication 10 ML: at 10:15

## 2019-04-20 RX ADMIN — ERTAPENEM SODIUM 1 G: 1 INJECTION, POWDER, LYOPHILIZED, FOR SOLUTION INTRAMUSCULAR; INTRAVENOUS at 10:15

## 2019-04-20 RX ADMIN — Medication 10 ML: at 10:45

## 2019-04-20 RX ADMIN — Medication 500 UNITS: at 10:45

## 2019-04-20 NOTE — PROGRESS NOTES
[facility] Progress Note Date: 2019 Name: La Nena Marlow MRN: [id number] : 1940 
 
1015. Ms. Luci Ayala Arrived ambulatory and in no distress for Daily Antibotics. Assessment was completed, no acute issues at this time, no new complaints voiced. RUE PICC line with + blood return, dressing CDI. Ecchymosis noted around PICC insertion- patient reports it has been that way since the day it was insert. Flushing without difficulty, denies pain. Patient Vitals for the past 12 hrs: 
 Temp Pulse Resp BP  
19 1016 97 °F (36.1 °C) 67 18 169/72 Medications: 
Invanz IV 
 
1050. Ms. Luci Ayala tolerated treatment well and was discharged from [place] [address] in stable condition. She is to return on 19 for her next appointment. Iza Bernardo RN 2019

## 2019-04-21 ENCOUNTER — HOSPITAL ENCOUNTER (OUTPATIENT)
Dept: INFUSION THERAPY | Age: 79
Discharge: HOME | End: 2019-04-21
Payer: MEDICARE

## 2019-04-21 VITALS
DIASTOLIC BLOOD PRESSURE: 66 MMHG | TEMPERATURE: 96.8 F | HEART RATE: 66 BPM | SYSTOLIC BLOOD PRESSURE: 150 MMHG | RESPIRATION RATE: 18 BRPM

## 2019-04-21 PROCEDURE — 96365 THER/PROPH/DIAG IV INF INIT: CPT

## 2019-04-21 PROCEDURE — 74011250636 HC RX REV CODE- 250/636: Performed by: INTERNAL MEDICINE

## 2019-04-21 PROCEDURE — 74011000258 HC RX REV CODE- 258: Performed by: INTERNAL MEDICINE

## 2019-04-21 RX ORDER — HEPARIN 100 UNIT/ML
500 SYRINGE INTRAVENOUS AS NEEDED
Status: DISCONTINUED | OUTPATIENT
Start: 2019-04-21 | End: 2019-04-22 | Stop reason: HOSPADM

## 2019-04-21 RX ORDER — SODIUM CHLORIDE 0.9 % (FLUSH) 0.9 %
5-10 SYRINGE (ML) INJECTION AS NEEDED
Status: DISCONTINUED | OUTPATIENT
Start: 2019-04-21 | End: 2019-04-22 | Stop reason: HOSPADM

## 2019-04-21 RX ADMIN — ERTAPENEM SODIUM 1 G: 1 INJECTION, POWDER, LYOPHILIZED, FOR SOLUTION INTRAMUSCULAR; INTRAVENOUS at 10:17

## 2019-04-21 RX ADMIN — Medication 500 UNITS: at 10:47

## 2019-04-21 RX ADMIN — Medication 10 ML: at 10:15

## 2019-04-21 RX ADMIN — Medication 10 ML: at 10:47

## 2019-04-21 NOTE — PROGRESS NOTES
[facility] Progress Note Date: 2019 Name: Jessica Harrell MRN: [id number] : 1940 
 
1010. Ms. Berry Wolfe Arrived ambulatory and in no distress for Daily Antibotics. Assessment was completed, no acute issues at this time, no new complaints voiced. RUE PICC line with + blood return, dressing CDI. Patient Vitals for the past 12 hrs: 
 Temp Pulse Resp BP  
19 1015 96.8 °F (36 °C) 66 18 150/66 Medications: 
Invanz IV 
 
1050. Ms. Berry Wolfe tolerated treatment well and was discharged from [place] [address] in stable condition. She is to return on 19 for her next appointment. Joycelyn Unger RN 2019

## 2019-04-22 ENCOUNTER — HOSPITAL ENCOUNTER (OUTPATIENT)
Dept: INFUSION THERAPY | Age: 79
Discharge: HOME | End: 2019-04-22
Payer: MEDICARE

## 2019-04-22 VITALS
RESPIRATION RATE: 18 BRPM | TEMPERATURE: 97.7 F | SYSTOLIC BLOOD PRESSURE: 148 MMHG | DIASTOLIC BLOOD PRESSURE: 66 MMHG | HEART RATE: 67 BPM

## 2019-04-22 LAB
ALBUMIN SERPL-MCNC: 3.1 G/DL (ref 3.5–5)
ALBUMIN/GLOB SERPL: 0.9 {RATIO} (ref 1.1–2.2)
ALP SERPL-CCNC: 108 U/L (ref 45–117)
ALT SERPL-CCNC: 20 U/L (ref 12–78)
ANION GAP SERPL CALC-SCNC: 5 MMOL/L (ref 5–15)
AST SERPL-CCNC: 16 U/L (ref 15–37)
BASOPHILS # BLD: 0 K/UL (ref 0–0.1)
BASOPHILS NFR BLD: 1 % (ref 0–1)
BILIRUB SERPL-MCNC: 0.4 MG/DL (ref 0.2–1)
BUN SERPL-MCNC: 19 MG/DL (ref 6–20)
BUN/CREAT SERPL: 22 (ref 12–20)
CALCIUM SERPL-MCNC: 9 MG/DL (ref 8.5–10.1)
CHLORIDE SERPL-SCNC: 103 MMOL/L (ref 97–108)
CO2 SERPL-SCNC: 31 MMOL/L (ref 21–32)
CREAT SERPL-MCNC: 0.85 MG/DL (ref 0.55–1.02)
CRP SERPL-MCNC: 1.15 MG/DL (ref 0–0.6)
DIFFERENTIAL METHOD BLD: ABNORMAL
EOSINOPHIL # BLD: 0.2 K/UL (ref 0–0.4)
EOSINOPHIL NFR BLD: 3 % (ref 0–7)
ERYTHROCYTE [DISTWIDTH] IN BLOOD BY AUTOMATED COUNT: 14.7 % (ref 11.5–14.5)
ERYTHROCYTE [SEDIMENTATION RATE] IN BLOOD: 33 MM/HR (ref 0–30)
GLOBULIN SER CALC-MCNC: 3.5 G/DL (ref 2–4)
GLUCOSE SERPL-MCNC: 107 MG/DL (ref 65–100)
HCT VFR BLD AUTO: 40.9 % (ref 35–47)
HGB BLD-MCNC: 13.6 G/DL (ref 11.5–16)
IMM GRANULOCYTES # BLD AUTO: 0.1 K/UL (ref 0–0.04)
IMM GRANULOCYTES NFR BLD AUTO: 1 % (ref 0–0.5)
LYMPHOCYTES # BLD: 2.1 K/UL (ref 0.8–3.5)
LYMPHOCYTES NFR BLD: 27 % (ref 12–49)
MCH RBC QN AUTO: 30.4 PG (ref 26–34)
MCHC RBC AUTO-ENTMCNC: 33.3 G/DL (ref 30–36.5)
MCV RBC AUTO: 91.3 FL (ref 80–99)
MONOCYTES # BLD: 0.5 K/UL (ref 0–1)
MONOCYTES NFR BLD: 7 % (ref 5–13)
NEUTS SEG # BLD: 4.6 K/UL (ref 1.8–8)
NEUTS SEG NFR BLD: 61 % (ref 32–75)
NRBC # BLD: 0 K/UL (ref 0–0.01)
NRBC BLD-RTO: 0 PER 100 WBC
PLATELET # BLD AUTO: 217 K/UL (ref 150–400)
PMV BLD AUTO: 11.2 FL (ref 8.9–12.9)
POTASSIUM SERPL-SCNC: 3.4 MMOL/L (ref 3.5–5.1)
PROT SERPL-MCNC: 6.6 G/DL (ref 6.4–8.2)
RBC # BLD AUTO: 4.48 M/UL (ref 3.8–5.2)
SODIUM SERPL-SCNC: 139 MMOL/L (ref 136–145)
WBC # BLD AUTO: 7.6 K/UL (ref 3.6–11)

## 2019-04-22 PROCEDURE — 85652 RBC SED RATE AUTOMATED: CPT

## 2019-04-22 PROCEDURE — 96365 THER/PROPH/DIAG IV INF INIT: CPT

## 2019-04-22 PROCEDURE — 80053 COMPREHEN METABOLIC PANEL: CPT

## 2019-04-22 PROCEDURE — 85025 COMPLETE CBC W/AUTO DIFF WBC: CPT

## 2019-04-22 PROCEDURE — 36415 COLL VENOUS BLD VENIPUNCTURE: CPT

## 2019-04-22 PROCEDURE — 74011250636 HC RX REV CODE- 250/636: Performed by: INTERNAL MEDICINE

## 2019-04-22 PROCEDURE — 86140 C-REACTIVE PROTEIN: CPT

## 2019-04-22 PROCEDURE — 74011000258 HC RX REV CODE- 258: Performed by: INTERNAL MEDICINE

## 2019-04-22 RX ADMIN — ERTAPENEM SODIUM 1 G: 1 INJECTION, POWDER, LYOPHILIZED, FOR SOLUTION INTRAMUSCULAR; INTRAVENOUS at 14:19

## 2019-04-22 NOTE — PROGRESS NOTES
1415 Pt admit to [facility] for Marina Maple ambulatory in stable condition. Assessment completed. No new concerns voiced. PICC with positive blood return. Visit Vitals /66 Pulse 67 Temp 97.7 °F (36.5 °C) Resp 18 Medications: 
Marina Maple 1450 Pt tolerated treatment well. PICC maintained positive blood return throughout treatment, flushed with positive blood return at conclusion and PICC heparinized and curos caps placed on end clave. D/c home ambulatory in no distress. Pt aware of next [facility] appointment scheduled for 4/23/19. Recent Results (from the past 12 hour(s)) CBC WITH AUTOMATED DIFF Collection Time: 04/22/19  2:16 PM  
Result Value Ref Range WBC 7.6 3.6 - 11.0 K/uL  
 RBC 4.48 3.80 - 5.20 M/uL  
 HGB 13.6 11.5 - 16.0 g/dL HCT 40.9 35.0 - 47.0 % MCV 91.3 80.0 - 99.0 FL  
 MCH 30.4 26.0 - 34.0 PG  
 MCHC 33.3 30.0 - 36.5 g/dL  
 RDW 14.7 (H) 11.5 - 14.5 % PLATELET 941 948 - 227 K/uL MPV 11.2 8.9 - 12.9 FL  
 NRBC 0.0 0  WBC ABSOLUTE NRBC 0.00 0.00 - 0.01 K/uL NEUTROPHILS 61 32 - 75 % LYMPHOCYTES 27 12 - 49 % MONOCYTES 7 5 - 13 % EOSINOPHILS 3 0 - 7 % BASOPHILS 1 0 - 1 % IMMATURE GRANULOCYTES 1 (H) 0.0 - 0.5 % ABS. NEUTROPHILS 4.6 1.8 - 8.0 K/UL  
 ABS. LYMPHOCYTES 2.1 0.8 - 3.5 K/UL  
 ABS. MONOCYTES 0.5 0.0 - 1.0 K/UL  
 ABS. EOSINOPHILS 0.2 0.0 - 0.4 K/UL  
 ABS. BASOPHILS 0.0 0.0 - 0.1 K/UL  
 ABS. IMM. GRANS. 0.1 (H) 0.00 - 0.04 K/UL  
 DF AUTOMATED METABOLIC PANEL, COMPREHENSIVE Collection Time: 04/22/19  2:16 PM  
Result Value Ref Range Sodium 139 136 - 145 mmol/L Potassium 3.4 (L) 3.5 - 5.1 mmol/L Chloride 103 97 - 108 mmol/L  
 CO2 31 21 - 32 mmol/L Anion gap 5 5 - 15 mmol/L Glucose 107 (H) 65 - 100 mg/dL BUN 19 6 - 20 MG/DL Creatinine 0.85 0.55 - 1.02 MG/DL  
 BUN/Creatinine ratio 22 (H) 12 - 20 GFR est AA >60 >60 ml/min/1.73m2 GFR est non-AA >60 >60 ml/min/1.73m2  Calcium 9.0 8.5 - 10.1 MG/DL  
 Bilirubin, total 0.4 0.2 - 1.0 MG/DL  
 ALT (SGPT) 20 12 - 78 U/L  
 AST (SGOT) 16 15 - 37 U/L Alk. phosphatase 108 45 - 117 U/L Protein, total 6.6 6.4 - 8.2 g/dL Albumin 3.1 (L) 3.5 - 5.0 g/dL Globulin 3.5 2.0 - 4.0 g/dL A-G Ratio 0.9 (L) 1.1 - 2.2 SED RATE (ESR) Collection Time: 04/22/19  2:16 PM  
Result Value Ref Range Sed rate, automated 33 (H) 0 - 30 mm/hr C REACTIVE PROTEIN, QT Collection Time: 04/22/19  2:16 PM  
Result Value Ref Range C-Reactive protein 1.15 (H) 0.00 - 0.60 mg/dL

## 2019-04-23 ENCOUNTER — HOSPITAL ENCOUNTER (OUTPATIENT)
Dept: INFUSION THERAPY | Age: 79
Discharge: HOME | End: 2019-04-23
Payer: MEDICARE

## 2019-04-23 VITALS
DIASTOLIC BLOOD PRESSURE: 73 MMHG | HEART RATE: 81 BPM | SYSTOLIC BLOOD PRESSURE: 153 MMHG | RESPIRATION RATE: 18 BRPM | TEMPERATURE: 98.7 F

## 2019-04-23 PROCEDURE — 74011000258 HC RX REV CODE- 258: Performed by: INTERNAL MEDICINE

## 2019-04-23 PROCEDURE — 96365 THER/PROPH/DIAG IV INF INIT: CPT

## 2019-04-23 PROCEDURE — 74011250636 HC RX REV CODE- 250/636: Performed by: INTERNAL MEDICINE

## 2019-04-23 RX ADMIN — ERTAPENEM SODIUM 1 G: 1 INJECTION, POWDER, LYOPHILIZED, FOR SOLUTION INTRAMUSCULAR; INTRAVENOUS at 16:10

## 2019-04-23 NOTE — PROGRESS NOTES
Outpatient Infusion Center - Chemotherapy Progress Note 1600 Pt admit to [facility] for daily Invanz ambulatory in stable condition. Assessment completed. No new concerns voiced. PICC line flushed with positive blood return. Visit Vitals /73 Pulse 81 Temp 98.7 °F (37.1 °C) Resp 18 Medications: 
Invanz 1 g 
 
1645 Pt tolerated treatment well. D/c home ambulatory in no distress. Pt aware of next [facility] appointment scheduled for 04/24/2019.

## 2019-04-24 ENCOUNTER — HOSPITAL ENCOUNTER (OUTPATIENT)
Dept: INFUSION THERAPY | Age: 79
Discharge: HOME | End: 2019-04-24
Payer: MEDICARE

## 2019-04-24 VITALS
SYSTOLIC BLOOD PRESSURE: 138 MMHG | RESPIRATION RATE: 19 BRPM | TEMPERATURE: 98.8 F | HEART RATE: 71 BPM | DIASTOLIC BLOOD PRESSURE: 66 MMHG

## 2019-04-24 LAB
BACTERIA SPEC CULT: NORMAL
GRAM STN SPEC: NORMAL
SERVICE CMNT-IMP: NORMAL

## 2019-04-24 PROCEDURE — 96365 THER/PROPH/DIAG IV INF INIT: CPT

## 2019-04-24 PROCEDURE — 74011250636 HC RX REV CODE- 250/636: Performed by: INTERNAL MEDICINE

## 2019-04-24 PROCEDURE — 74011000258 HC RX REV CODE- 258: Performed by: INTERNAL MEDICINE

## 2019-04-24 PROCEDURE — 77030020847 HC STATLOK BARD -A

## 2019-04-24 RX ADMIN — ERTAPENEM SODIUM 1 G: 1 INJECTION, POWDER, LYOPHILIZED, FOR SOLUTION INTRAMUSCULAR; INTRAVENOUS at 16:06

## 2019-04-24 NOTE — PROGRESS NOTES
Outpatient Infusion Center - Chemotherapy Progress Note 1600 Pt admit to [facility] for daily Invanz ambulatory in stable condition. Assessment completed. No new concerns voiced. PICC line flushed with positive blood return. Visit Vitals /66 Pulse 71 Temp 98.8 °F (37.1 °C) Resp 19 Medications: 
Invanz 1 g 
 
1650 Pt tolerated treatment well. PICC line dressing changed per protocol. D/c home ambulatory in no distress. Pt aware of next [facility] appointment scheduled for 04/25/2019.

## 2019-04-25 ENCOUNTER — HOSPITAL ENCOUNTER (OUTPATIENT)
Dept: INFUSION THERAPY | Age: 79
Discharge: HOME | End: 2019-04-25
Payer: MEDICARE

## 2019-04-25 VITALS
HEART RATE: 73 BPM | DIASTOLIC BLOOD PRESSURE: 76 MMHG | SYSTOLIC BLOOD PRESSURE: 135 MMHG | TEMPERATURE: 98.6 F | RESPIRATION RATE: 18 BRPM

## 2019-04-25 LAB
ALBUMIN SERPL-MCNC: 3.2 G/DL (ref 3.5–5)
ALBUMIN/GLOB SERPL: 0.9 {RATIO} (ref 1.1–2.2)
ALP SERPL-CCNC: 109 U/L (ref 45–117)
ALT SERPL-CCNC: 23 U/L (ref 12–78)
ANION GAP SERPL CALC-SCNC: 5 MMOL/L (ref 5–15)
AST SERPL-CCNC: 18 U/L (ref 15–37)
BASOPHILS # BLD: 0.1 K/UL (ref 0–0.1)
BASOPHILS NFR BLD: 1 % (ref 0–1)
BILIRUB SERPL-MCNC: 0.5 MG/DL (ref 0.2–1)
BUN SERPL-MCNC: 19 MG/DL (ref 6–20)
BUN/CREAT SERPL: 23 (ref 12–20)
CALCIUM SERPL-MCNC: 9.3 MG/DL (ref 8.5–10.1)
CHLORIDE SERPL-SCNC: 105 MMOL/L (ref 97–108)
CO2 SERPL-SCNC: 30 MMOL/L (ref 21–32)
CREAT SERPL-MCNC: 0.81 MG/DL (ref 0.55–1.02)
CRP SERPL-MCNC: 0.78 MG/DL (ref 0–0.6)
DIFFERENTIAL METHOD BLD: ABNORMAL
EOSINOPHIL # BLD: 0.3 K/UL (ref 0–0.4)
EOSINOPHIL NFR BLD: 3 % (ref 0–7)
ERYTHROCYTE [DISTWIDTH] IN BLOOD BY AUTOMATED COUNT: 14.9 % (ref 11.5–14.5)
ERYTHROCYTE [SEDIMENTATION RATE] IN BLOOD: 31 MM/HR (ref 0–30)
GLOBULIN SER CALC-MCNC: 3.5 G/DL (ref 2–4)
GLUCOSE SERPL-MCNC: 106 MG/DL (ref 65–100)
HCT VFR BLD AUTO: 41.7 % (ref 35–47)
HGB BLD-MCNC: 13.6 G/DL (ref 11.5–16)
IMM GRANULOCYTES # BLD AUTO: 0.1 K/UL (ref 0–0.04)
IMM GRANULOCYTES NFR BLD AUTO: 1 % (ref 0–0.5)
LYMPHOCYTES # BLD: 2.2 K/UL (ref 0.8–3.5)
LYMPHOCYTES NFR BLD: 28 % (ref 12–49)
MCH RBC QN AUTO: 30 PG (ref 26–34)
MCHC RBC AUTO-ENTMCNC: 32.6 G/DL (ref 30–36.5)
MCV RBC AUTO: 92.1 FL (ref 80–99)
MONOCYTES # BLD: 0.5 K/UL (ref 0–1)
MONOCYTES NFR BLD: 6 % (ref 5–13)
NEUTS SEG # BLD: 4.9 K/UL (ref 1.8–8)
NEUTS SEG NFR BLD: 61 % (ref 32–75)
NRBC # BLD: 0 K/UL (ref 0–0.01)
NRBC BLD-RTO: 0 PER 100 WBC
PLATELET # BLD AUTO: 227 K/UL (ref 150–400)
PMV BLD AUTO: 11.1 FL (ref 8.9–12.9)
POTASSIUM SERPL-SCNC: 3.8 MMOL/L (ref 3.5–5.1)
PROT SERPL-MCNC: 6.7 G/DL (ref 6.4–8.2)
RBC # BLD AUTO: 4.53 M/UL (ref 3.8–5.2)
SODIUM SERPL-SCNC: 140 MMOL/L (ref 136–145)
WBC # BLD AUTO: 8 K/UL (ref 3.6–11)

## 2019-04-25 PROCEDURE — 85025 COMPLETE CBC W/AUTO DIFF WBC: CPT

## 2019-04-25 PROCEDURE — 74011000258 HC RX REV CODE- 258: Performed by: INTERNAL MEDICINE

## 2019-04-25 PROCEDURE — 36415 COLL VENOUS BLD VENIPUNCTURE: CPT

## 2019-04-25 PROCEDURE — 86140 C-REACTIVE PROTEIN: CPT

## 2019-04-25 PROCEDURE — 96365 THER/PROPH/DIAG IV INF INIT: CPT

## 2019-04-25 PROCEDURE — 80053 COMPREHEN METABOLIC PANEL: CPT

## 2019-04-25 PROCEDURE — 85652 RBC SED RATE AUTOMATED: CPT

## 2019-04-25 PROCEDURE — 74011250636 HC RX REV CODE- 250/636: Performed by: INTERNAL MEDICINE

## 2019-04-25 RX ADMIN — SODIUM CHLORIDE 1 G: 900 INJECTION, SOLUTION INTRAVENOUS at 16:07

## 2019-04-25 NOTE — PROGRESS NOTES
1600 Pt admit to [facility] for [address] ambulatory in stable condition. Assessment completed. No new concerns voiced. PICC with positive blood return. Labs drawn per order and sent for processing. See [facility] for pending lab results. Visit Vitals /76 Pulse 73 Temp 98.6 °F (37 °C) Resp 18 Medications: 
Invanz 
 
1600 Pt tolerated treatment well. PICC maintained positive blood return throughout treatment, flushed with positive blood return at conclusion and PICC. D/c home ambulatory in no distress. Pt aware of next [facility] appointment scheduled for 4/26/19. Recent Results (from the past 12 hour(s)) CBC WITH AUTOMATED DIFF Collection Time: 04/25/19  4:07 PM  
Result Value Ref Range WBC 8.0 3.6 - 11.0 K/uL  
 RBC 4.53 3.80 - 5.20 M/uL  
 HGB 13.6 11.5 - 16.0 g/dL HCT 41.7 35.0 - 47.0 % MCV 92.1 80.0 - 99.0 FL  
 MCH 30.0 26.0 - 34.0 PG  
 MCHC 32.6 30.0 - 36.5 g/dL  
 RDW 14.9 (H) 11.5 - 14.5 % PLATELET 785 471 - 408 K/uL MPV 11.1 8.9 - 12.9 FL  
 NRBC 0.0 0  WBC ABSOLUTE NRBC 0.00 0.00 - 0.01 K/uL NEUTROPHILS 61 32 - 75 % LYMPHOCYTES 28 12 - 49 % MONOCYTES 6 5 - 13 % EOSINOPHILS 3 0 - 7 % BASOPHILS 1 0 - 1 % IMMATURE GRANULOCYTES 1 (H) 0.0 - 0.5 % ABS. NEUTROPHILS 4.9 1.8 - 8.0 K/UL  
 ABS. LYMPHOCYTES 2.2 0.8 - 3.5 K/UL  
 ABS. MONOCYTES 0.5 0.0 - 1.0 K/UL  
 ABS. EOSINOPHILS 0.3 0.0 - 0.4 K/UL  
 ABS. BASOPHILS 0.1 0.0 - 0.1 K/UL  
 ABS. IMM. GRANS. 0.1 (H) 0.00 - 0.04 K/UL  
 DF AUTOMATED

## 2019-04-26 ENCOUNTER — HOSPITAL ENCOUNTER (OUTPATIENT)
Dept: INFUSION THERAPY | Age: 79
Discharge: HOME | End: 2019-04-26
Payer: MEDICARE

## 2019-04-26 VITALS
DIASTOLIC BLOOD PRESSURE: 71 MMHG | SYSTOLIC BLOOD PRESSURE: 135 MMHG | TEMPERATURE: 97 F | HEART RATE: 69 BPM | RESPIRATION RATE: 16 BRPM

## 2019-04-26 PROCEDURE — 96365 THER/PROPH/DIAG IV INF INIT: CPT

## 2019-04-26 PROCEDURE — 74011000258 HC RX REV CODE- 258: Performed by: INTERNAL MEDICINE

## 2019-04-26 PROCEDURE — 74011250636 HC RX REV CODE- 250/636: Performed by: INTERNAL MEDICINE

## 2019-04-26 RX ADMIN — SODIUM CHLORIDE 1 G: 900 INJECTION, SOLUTION INTRAVENOUS at 16:15

## 2019-04-26 NOTE — PROGRESS NOTES
1600 Pt admit to [facility] for [address] ambulatory in stable condition. Assessment completed. No new concerns voiced. PICC with positive blood return. Visit Vitals /71 Pulse 69 Temp 97 °F (36.1 °C) Resp 16 Medications: 
[address] 1645 Pt tolerated treatment well. PICC flushed at conclusion, heparinized and capped. D/c home ambulatory in no distress. Pt aware of next [facility] appointment scheduled for 4/27/19.

## 2019-04-27 ENCOUNTER — HOSPITAL ENCOUNTER (OUTPATIENT)
Dept: INFUSION THERAPY | Age: 79
Discharge: HOME | End: 2019-04-27
Payer: MEDICARE

## 2019-04-27 VITALS
SYSTOLIC BLOOD PRESSURE: 139 MMHG | TEMPERATURE: 96.9 F | DIASTOLIC BLOOD PRESSURE: 67 MMHG | HEART RATE: 70 BPM | RESPIRATION RATE: 16 BRPM

## 2019-04-27 PROCEDURE — 96365 THER/PROPH/DIAG IV INF INIT: CPT

## 2019-04-27 PROCEDURE — 74011250636 HC RX REV CODE- 250/636: Performed by: INTERNAL MEDICINE

## 2019-04-27 PROCEDURE — 74011000258 HC RX REV CODE- 258: Performed by: INTERNAL MEDICINE

## 2019-04-27 RX ORDER — SODIUM CHLORIDE 0.9 % (FLUSH) 0.9 %
5-10 SYRINGE (ML) INJECTION AS NEEDED
Status: DISCONTINUED | OUTPATIENT
Start: 2019-04-27 | End: 2019-04-28 | Stop reason: HOSPADM

## 2019-04-27 RX ORDER — HEPARIN 100 UNIT/ML
500 SYRINGE INTRAVENOUS AS NEEDED
Status: DISCONTINUED | OUTPATIENT
Start: 2019-04-27 | End: 2019-04-28 | Stop reason: HOSPADM

## 2019-04-27 RX ADMIN — Medication 500 UNITS: at 12:03

## 2019-04-27 RX ADMIN — Medication 10 ML: at 11:59

## 2019-04-27 RX ADMIN — SODIUM CHLORIDE 1 G: 900 INJECTION, SOLUTION INTRAVENOUS at 10:21

## 2019-04-27 NOTE — PROGRESS NOTES
Outpatient Infusion Center Short Visit Progress Note Patient admitted to [facility] for daily antibiotic ambulatory in stable condition. Assessment completed. No new concerns voiced. Visit Vitals Pulse 70 Temp 96.9 °F (36.1 °C) Resp 16  
 
 
R arm PICC with positive blood return. Medications: 
Zulma Tapia Patient tolerated treatment well. Patient discharged from [place] [address] ambulatory in no distress. Patient aware of next appointment scheduled for 4/28/19.  
 
[facility]

## 2019-04-28 ENCOUNTER — HOSPITAL ENCOUNTER (OUTPATIENT)
Dept: INFUSION THERAPY | Age: 79
Discharge: HOME | End: 2019-04-28
Payer: MEDICARE

## 2019-04-28 VITALS
SYSTOLIC BLOOD PRESSURE: 150 MMHG | TEMPERATURE: 97 F | RESPIRATION RATE: 16 BRPM | HEART RATE: 63 BPM | DIASTOLIC BLOOD PRESSURE: 81 MMHG

## 2019-04-28 PROCEDURE — 96365 THER/PROPH/DIAG IV INF INIT: CPT

## 2019-04-28 PROCEDURE — 74011000258 HC RX REV CODE- 258: Performed by: INTERNAL MEDICINE

## 2019-04-28 PROCEDURE — 74011250636 HC RX REV CODE- 250/636: Performed by: INTERNAL MEDICINE

## 2019-04-28 RX ORDER — HEPARIN 100 UNIT/ML
500 SYRINGE INTRAVENOUS AS NEEDED
Status: DISCONTINUED | OUTPATIENT
Start: 2019-04-28 | End: 2019-04-29 | Stop reason: HOSPADM

## 2019-04-28 RX ORDER — SODIUM CHLORIDE 9 MG/ML
10 INJECTION INTRAMUSCULAR; INTRAVENOUS; SUBCUTANEOUS AS NEEDED
Status: DISCONTINUED | OUTPATIENT
Start: 2019-04-28 | End: 2019-04-29 | Stop reason: HOSPADM

## 2019-04-28 RX ADMIN — SODIUM CHLORIDE 10 ML: 9 INJECTION INTRAMUSCULAR; INTRAVENOUS; SUBCUTANEOUS at 10:51

## 2019-04-28 RX ADMIN — Medication 500 UNITS: at 11:33

## 2019-04-28 RX ADMIN — SODIUM CHLORIDE 1 G: 900 INJECTION, SOLUTION INTRAVENOUS at 11:04

## 2019-04-28 RX ADMIN — SODIUM CHLORIDE 10 ML: 9 INJECTION INTRAMUSCULAR; INTRAVENOUS; SUBCUTANEOUS at 11:33

## 2019-04-29 ENCOUNTER — HOSPITAL ENCOUNTER (OUTPATIENT)
Dept: INFUSION THERAPY | Age: 79
Discharge: HOME | End: 2019-04-29
Payer: MEDICARE

## 2019-04-29 VITALS
RESPIRATION RATE: 18 BRPM | DIASTOLIC BLOOD PRESSURE: 67 MMHG | HEART RATE: 64 BPM | SYSTOLIC BLOOD PRESSURE: 161 MMHG | TEMPERATURE: 97 F

## 2019-04-29 LAB
ALBUMIN SERPL-MCNC: 3.4 G/DL (ref 3.5–5)
ALBUMIN/GLOB SERPL: 0.9 {RATIO} (ref 1.1–2.2)
ALP SERPL-CCNC: 117 U/L (ref 45–117)
ALT SERPL-CCNC: 26 U/L (ref 12–78)
ANION GAP SERPL CALC-SCNC: 7 MMOL/L (ref 5–15)
AST SERPL-CCNC: 24 U/L (ref 15–37)
BASOPHILS # BLD: 0.1 K/UL (ref 0–0.1)
BASOPHILS NFR BLD: 1 % (ref 0–1)
BILIRUB SERPL-MCNC: 0.5 MG/DL (ref 0.2–1)
BUN SERPL-MCNC: 21 MG/DL (ref 6–20)
BUN/CREAT SERPL: 25 (ref 12–20)
CALCIUM SERPL-MCNC: 9.4 MG/DL (ref 8.5–10.1)
CHLORIDE SERPL-SCNC: 103 MMOL/L (ref 97–108)
CO2 SERPL-SCNC: 30 MMOL/L (ref 21–32)
CREAT SERPL-MCNC: 0.83 MG/DL (ref 0.55–1.02)
CRP SERPL-MCNC: 0.94 MG/DL (ref 0–0.6)
DIFFERENTIAL METHOD BLD: ABNORMAL
EOSINOPHIL # BLD: 0.2 K/UL (ref 0–0.4)
EOSINOPHIL NFR BLD: 3 % (ref 0–7)
ERYTHROCYTE [DISTWIDTH] IN BLOOD BY AUTOMATED COUNT: 14.9 % (ref 11.5–14.5)
ERYTHROCYTE [SEDIMENTATION RATE] IN BLOOD: 28 MM/HR (ref 0–30)
GLOBULIN SER CALC-MCNC: 3.8 G/DL (ref 2–4)
GLUCOSE SERPL-MCNC: 85 MG/DL (ref 65–100)
HCT VFR BLD AUTO: 41.6 % (ref 35–47)
HGB BLD-MCNC: 13.5 G/DL (ref 11.5–16)
IMM GRANULOCYTES # BLD AUTO: 0.1 K/UL (ref 0–0.04)
IMM GRANULOCYTES NFR BLD AUTO: 2 % (ref 0–0.5)
LYMPHOCYTES # BLD: 2.1 K/UL (ref 0.8–3.5)
LYMPHOCYTES NFR BLD: 29 % (ref 12–49)
MCH RBC QN AUTO: 29.3 PG (ref 26–34)
MCHC RBC AUTO-ENTMCNC: 32.5 G/DL (ref 30–36.5)
MCV RBC AUTO: 90.4 FL (ref 80–99)
MONOCYTES # BLD: 0.5 K/UL (ref 0–1)
MONOCYTES NFR BLD: 7 % (ref 5–13)
NEUTS SEG # BLD: 4.3 K/UL (ref 1.8–8)
NEUTS SEG NFR BLD: 58 % (ref 32–75)
NRBC # BLD: 0 K/UL (ref 0–0.01)
NRBC BLD-RTO: 0 PER 100 WBC
PLATELET # BLD AUTO: 195 K/UL (ref 150–400)
PMV BLD AUTO: 10.6 FL (ref 8.9–12.9)
POTASSIUM SERPL-SCNC: 3.6 MMOL/L (ref 3.5–5.1)
PROT SERPL-MCNC: 7.2 G/DL (ref 6.4–8.2)
RBC # BLD AUTO: 4.6 M/UL (ref 3.8–5.2)
SODIUM SERPL-SCNC: 140 MMOL/L (ref 136–145)
WBC # BLD AUTO: 7.2 K/UL (ref 3.6–11)

## 2019-04-29 PROCEDURE — 86140 C-REACTIVE PROTEIN: CPT

## 2019-04-29 PROCEDURE — 96365 THER/PROPH/DIAG IV INF INIT: CPT

## 2019-04-29 PROCEDURE — 85652 RBC SED RATE AUTOMATED: CPT

## 2019-04-29 PROCEDURE — 74011250636 HC RX REV CODE- 250/636: Performed by: INTERNAL MEDICINE

## 2019-04-29 PROCEDURE — 74011000258 HC RX REV CODE- 258: Performed by: INTERNAL MEDICINE

## 2019-04-29 PROCEDURE — 80053 COMPREHEN METABOLIC PANEL: CPT

## 2019-04-29 PROCEDURE — 85025 COMPLETE CBC W/AUTO DIFF WBC: CPT

## 2019-04-29 PROCEDURE — 36415 COLL VENOUS BLD VENIPUNCTURE: CPT

## 2019-04-29 RX ADMIN — SODIUM CHLORIDE 1 G: 900 INJECTION, SOLUTION INTRAVENOUS at 16:13

## 2019-04-29 NOTE — PROGRESS NOTES
1610 Pt admit to [facility] for Loletta Reamer ambulatory in stable condition. Assessment completed. No new concerns voiced. PICC with positive blood return. Labs drawn per order and sent for processing. Visit Vitals /67 Pulse 64 Temp 97 °F (36.1 °C) Resp 18 Medications: 
Loletta Reamer 1645 Pt tolerated treatment well. PICC maintained positive blood return throughout treatment, flushed with positive blood return at conclusion and PICC heparinized and curos cap placed on end clave. D/c home ambulatory in no distress. Pt aware of next [facility] appointment scheduled for 4/30/19. Recent Results (from the past 12 hour(s)) CBC WITH AUTOMATED DIFF Collection Time: 04/29/19  4:14 PM  
Result Value Ref Range WBC 7.2 3.6 - 11.0 K/uL  
 RBC 4.60 3.80 - 5.20 M/uL  
 HGB 13.5 11.5 - 16.0 g/dL HCT 41.6 35.0 - 47.0 % MCV 90.4 80.0 - 99.0 FL  
 MCH 29.3 26.0 - 34.0 PG  
 MCHC 32.5 30.0 - 36.5 g/dL  
 RDW 14.9 (H) 11.5 - 14.5 % PLATELET 256 881 - 855 K/uL MPV 10.6 8.9 - 12.9 FL  
 NRBC 0.0 0  WBC ABSOLUTE NRBC 0.00 0.00 - 0.01 K/uL NEUTROPHILS 58 32 - 75 % LYMPHOCYTES 29 12 - 49 % MONOCYTES 7 5 - 13 % EOSINOPHILS 3 0 - 7 % BASOPHILS 1 0 - 1 % IMMATURE GRANULOCYTES 2 (H) 0.0 - 0.5 % ABS. NEUTROPHILS 4.3 1.8 - 8.0 K/UL  
 ABS. LYMPHOCYTES 2.1 0.8 - 3.5 K/UL  
 ABS. MONOCYTES 0.5 0.0 - 1.0 K/UL  
 ABS. EOSINOPHILS 0.2 0.0 - 0.4 K/UL  
 ABS. BASOPHILS 0.1 0.0 - 0.1 K/UL  
 ABS. IMM. GRANS. 0.1 (H) 0.00 - 0.04 K/UL  
 DF AUTOMATED METABOLIC PANEL, COMPREHENSIVE Collection Time: 04/29/19  4:14 PM  
Result Value Ref Range Sodium 140 136 - 145 mmol/L Potassium 3.6 3.5 - 5.1 mmol/L Chloride 103 97 - 108 mmol/L  
 CO2 30 21 - 32 mmol/L Anion gap 7 5 - 15 mmol/L Glucose 85 65 - 100 mg/dL BUN 21 (H) 6 - 20 MG/DL Creatinine 0.83 0.55 - 1.02 MG/DL  
 BUN/Creatinine ratio 25 (H) 12 - 20 GFR est AA >60 >60 ml/min/1.73m2 GFR est non-AA >60 >60 ml/min/1.73m2 Calcium 9.4 8.5 - 10.1 MG/DL Bilirubin, total 0.5 0.2 - 1.0 MG/DL  
 ALT (SGPT) 26 12 - 78 U/L  
 AST (SGOT) 24 15 - 37 U/L Alk. phosphatase 117 45 - 117 U/L Protein, total 7.2 6.4 - 8.2 g/dL Albumin 3.4 (L) 3.5 - 5.0 g/dL Globulin 3.8 2.0 - 4.0 g/dL A-G Ratio 0.9 (L) 1.1 - 2.2 SED RATE (ESR) Collection Time: 04/29/19  4:14 PM  
Result Value Ref Range Sed rate, automated 28 0 - 30 mm/hr C REACTIVE PROTEIN, QT Collection Time: 04/29/19  4:14 PM  
Result Value Ref Range C-Reactive protein 0.94 (H) 0.00 - 0.60 mg/dL

## 2019-04-30 ENCOUNTER — HOSPITAL ENCOUNTER (OUTPATIENT)
Dept: INFUSION THERAPY | Age: 79
Discharge: HOME | End: 2019-04-30
Payer: MEDICARE

## 2019-04-30 VITALS
DIASTOLIC BLOOD PRESSURE: 71 MMHG | RESPIRATION RATE: 18 BRPM | OXYGEN SATURATION: 93 % | TEMPERATURE: 97.8 F | HEART RATE: 68 BPM | SYSTOLIC BLOOD PRESSURE: 163 MMHG

## 2019-04-30 PROCEDURE — 74011000258 HC RX REV CODE- 258: Performed by: INTERNAL MEDICINE

## 2019-04-30 PROCEDURE — 74011250636 HC RX REV CODE- 250/636: Performed by: INTERNAL MEDICINE

## 2019-04-30 PROCEDURE — 96365 THER/PROPH/DIAG IV INF INIT: CPT

## 2019-04-30 PROCEDURE — 96374 THER/PROPH/DIAG INJ IV PUSH: CPT

## 2019-04-30 RX ORDER — SODIUM CHLORIDE 0.9 % (FLUSH) 0.9 %
5-10 SYRINGE (ML) INJECTION AS NEEDED
Status: DISCONTINUED | OUTPATIENT
Start: 2019-04-30 | End: 2019-05-01 | Stop reason: HOSPADM

## 2019-04-30 RX ORDER — HEPARIN 100 UNIT/ML
500 SYRINGE INTRAVENOUS AS NEEDED
Status: DISCONTINUED | OUTPATIENT
Start: 2019-04-30 | End: 2019-05-01 | Stop reason: HOSPADM

## 2019-04-30 RX ADMIN — Medication 10 ML: at 16:19

## 2019-04-30 RX ADMIN — Medication 10 ML: at 16:40

## 2019-04-30 RX ADMIN — Medication 500 UNITS: at 16:40

## 2019-04-30 RX ADMIN — SODIUM CHLORIDE 1 G: 900 INJECTION, SOLUTION INTRAVENOUS at 16:16

## 2019-04-30 NOTE — PROGRESS NOTES
1610 Pt admit to [facility] for Eneida Ramos ambulatory in stable condition. Assessment completed. No new concerns voiced. PICC with positive blood return. Visit Vitals /71 (BP 1 Location: Left arm, BP Patient Position: At rest) Pulse 68 Temp 97.8 °F (36.6 °C) Resp 18 SpO2 93% Medications: 
Eneida Ramos 1649 Pt tolerated treatment well. PICC flushed at conclusion, heparinized and capped. D/c home ambulatory in no distress. Pt aware of next [facility] appointment scheduled for 5/1/19.

## 2019-05-01 ENCOUNTER — HOSPITAL ENCOUNTER (OUTPATIENT)
Dept: INFUSION THERAPY | Age: 79
Discharge: HOME | End: 2019-05-01
Payer: MEDICARE

## 2019-05-01 VITALS
HEART RATE: 72 BPM | SYSTOLIC BLOOD PRESSURE: 144 MMHG | TEMPERATURE: 97.3 F | RESPIRATION RATE: 18 BRPM | DIASTOLIC BLOOD PRESSURE: 80 MMHG

## 2019-05-01 PROCEDURE — 74011250636 HC RX REV CODE- 250/636: Performed by: INTERNAL MEDICINE

## 2019-05-01 PROCEDURE — 77030020847 HC STATLOK BARD -A

## 2019-05-01 PROCEDURE — 96365 THER/PROPH/DIAG IV INF INIT: CPT

## 2019-05-01 PROCEDURE — 74011000258 HC RX REV CODE- 258: Performed by: INTERNAL MEDICINE

## 2019-05-01 RX ORDER — HEPARIN 100 UNIT/ML
500 SYRINGE INTRAVENOUS AS NEEDED
Status: CANCELLED | OUTPATIENT
Start: 2019-05-01 | End: 2019-05-02

## 2019-05-01 RX ORDER — SODIUM CHLORIDE 0.9 % (FLUSH) 0.9 %
10-40 SYRINGE (ML) INJECTION AS NEEDED
Status: CANCELLED | OUTPATIENT
Start: 2019-05-01 | End: 2019-05-02

## 2019-05-01 RX ADMIN — ERTAPENEM SODIUM 1 G: 1 INJECTION, POWDER, LYOPHILIZED, FOR SOLUTION INTRAMUSCULAR; INTRAVENOUS at 16:10

## 2019-05-01 NOTE — PROGRESS NOTES
Outpatient Infusion Center Short Visit Progress Note 1600 Pt admit to [facility] for [address] ambulatory in stable condition. Assessment completed. No new concerns voiced. Please review pending lab results in [address] [place] [address]. Patient Vitals for the past 12 hrs: 
 Temp Pulse Resp BP  
05/01/19 1608 97.3 °F (36.3 °C) 72 18 144/80 PICC with positive blood return flushed, heparinized and capped per protocol dressing changed. Medications: 
Invanz 
 
 
1650 Pt tolerated treatment well. D/c home ambulatory in no distress. Pt aware of next appointment scheduled for 5/2/19.

## 2019-05-02 ENCOUNTER — HOSPITAL ENCOUNTER (OUTPATIENT)
Dept: INFUSION THERAPY | Age: 79
Discharge: HOME | End: 2019-05-02
Payer: MEDICARE

## 2019-05-02 VITALS
HEART RATE: 71 BPM | RESPIRATION RATE: 18 BRPM | DIASTOLIC BLOOD PRESSURE: 76 MMHG | SYSTOLIC BLOOD PRESSURE: 149 MMHG | TEMPERATURE: 97.4 F

## 2019-05-02 PROCEDURE — 74011000258 HC RX REV CODE- 258: Performed by: INTERNAL MEDICINE

## 2019-05-02 PROCEDURE — 96365 THER/PROPH/DIAG IV INF INIT: CPT

## 2019-05-02 PROCEDURE — 74011250636 HC RX REV CODE- 250/636: Performed by: INTERNAL MEDICINE

## 2019-05-02 RX ADMIN — ERTAPENEM SODIUM 1 G: 1 INJECTION, POWDER, LYOPHILIZED, FOR SOLUTION INTRAMUSCULAR; INTRAVENOUS at 16:15

## 2019-05-02 NOTE — PROGRESS NOTES
Outpatient Infusion Center Short Visit Progress Note 1610 Pt admit to [facility] for Jacqueline Montalvo ambulatory in stable condition. Assessment completed. No new concerns voiced. Patient Vitals for the past 12 hrs: 
 Temp Pulse Resp BP  
05/02/19 1616 97.4 °F (36.3 °C) 71 18 149/76 PICC with positive blood return flushed, heparinized and capped per protocol at conclusion. Medications: 
Invanz 
 
 
1650 Pt tolerated treatment well. D/c home ambulatory in no distress. Pt aware of next appointment scheduled for 5/3/19.

## 2019-05-03 ENCOUNTER — HOSPITAL ENCOUNTER (OUTPATIENT)
Dept: INFUSION THERAPY | Age: 79
Discharge: HOME | End: 2019-05-03
Payer: MEDICARE

## 2019-05-03 VITALS
DIASTOLIC BLOOD PRESSURE: 74 MMHG | TEMPERATURE: 97.3 F | SYSTOLIC BLOOD PRESSURE: 159 MMHG | RESPIRATION RATE: 18 BRPM | HEART RATE: 77 BPM

## 2019-05-03 LAB
ALBUMIN SERPL-MCNC: 3.4 G/DL (ref 3.5–5)
ALBUMIN/GLOB SERPL: 1 {RATIO} (ref 1.1–2.2)
ALP SERPL-CCNC: 115 U/L (ref 45–117)
ALT SERPL-CCNC: 25 U/L (ref 12–78)
ANION GAP SERPL CALC-SCNC: 7 MMOL/L (ref 5–15)
AST SERPL-CCNC: 21 U/L (ref 15–37)
BASOPHILS # BLD: 0.1 K/UL (ref 0–0.1)
BASOPHILS NFR BLD: 1 % (ref 0–1)
BILIRUB SERPL-MCNC: 0.6 MG/DL (ref 0.2–1)
BUN SERPL-MCNC: 27 MG/DL (ref 6–20)
BUN/CREAT SERPL: 28 (ref 12–20)
CALCIUM SERPL-MCNC: 9.4 MG/DL (ref 8.5–10.1)
CHLORIDE SERPL-SCNC: 105 MMOL/L (ref 97–108)
CO2 SERPL-SCNC: 29 MMOL/L (ref 21–32)
CREAT SERPL-MCNC: 0.95 MG/DL (ref 0.55–1.02)
DIFFERENTIAL METHOD BLD: ABNORMAL
EOSINOPHIL # BLD: 0.2 K/UL (ref 0–0.4)
EOSINOPHIL NFR BLD: 3 % (ref 0–7)
ERYTHROCYTE [DISTWIDTH] IN BLOOD BY AUTOMATED COUNT: 15 % (ref 11.5–14.5)
GLOBULIN SER CALC-MCNC: 3.5 G/DL (ref 2–4)
GLUCOSE SERPL-MCNC: 102 MG/DL (ref 65–100)
HCT VFR BLD AUTO: 42 % (ref 35–47)
HGB BLD-MCNC: 13.5 G/DL (ref 11.5–16)
IMM GRANULOCYTES # BLD AUTO: 0.1 K/UL (ref 0–0.04)
IMM GRANULOCYTES NFR BLD AUTO: 1 % (ref 0–0.5)
LYMPHOCYTES # BLD: 1.9 K/UL (ref 0.8–3.5)
LYMPHOCYTES NFR BLD: 25 % (ref 12–49)
MCH RBC QN AUTO: 29.5 PG (ref 26–34)
MCHC RBC AUTO-ENTMCNC: 32.1 G/DL (ref 30–36.5)
MCV RBC AUTO: 91.7 FL (ref 80–99)
MONOCYTES # BLD: 0.4 K/UL (ref 0–1)
MONOCYTES NFR BLD: 6 % (ref 5–13)
NEUTS SEG # BLD: 4.9 K/UL (ref 1.8–8)
NEUTS SEG NFR BLD: 64 % (ref 32–75)
NRBC # BLD: 0 K/UL (ref 0–0.01)
NRBC BLD-RTO: 0 PER 100 WBC
PLATELET # BLD AUTO: 188 K/UL (ref 150–400)
PMV BLD AUTO: 11.3 FL (ref 8.9–12.9)
POTASSIUM SERPL-SCNC: 3.4 MMOL/L (ref 3.5–5.1)
PROT SERPL-MCNC: 6.9 G/DL (ref 6.4–8.2)
RBC # BLD AUTO: 4.58 M/UL (ref 3.8–5.2)
SODIUM SERPL-SCNC: 141 MMOL/L (ref 136–145)
WBC # BLD AUTO: 7.5 K/UL (ref 3.6–11)

## 2019-05-03 PROCEDURE — 74011000258 HC RX REV CODE- 258: Performed by: INTERNAL MEDICINE

## 2019-05-03 PROCEDURE — 36415 COLL VENOUS BLD VENIPUNCTURE: CPT

## 2019-05-03 PROCEDURE — 85025 COMPLETE CBC W/AUTO DIFF WBC: CPT

## 2019-05-03 PROCEDURE — 74011250636 HC RX REV CODE- 250/636: Performed by: INTERNAL MEDICINE

## 2019-05-03 PROCEDURE — 96365 THER/PROPH/DIAG IV INF INIT: CPT

## 2019-05-03 PROCEDURE — 80053 COMPREHEN METABOLIC PANEL: CPT

## 2019-05-03 RX ORDER — HEPARIN 100 UNIT/ML
500 SYRINGE INTRAVENOUS AS NEEDED
Status: DISCONTINUED | OUTPATIENT
Start: 2019-05-03 | End: 2019-05-04 | Stop reason: HOSPADM

## 2019-05-03 RX ORDER — SODIUM CHLORIDE 0.9 % (FLUSH) 0.9 %
10 SYRINGE (ML) INJECTION AS NEEDED
Status: DISCONTINUED | OUTPATIENT
Start: 2019-05-03 | End: 2019-05-07 | Stop reason: HOSPADM

## 2019-05-03 RX ADMIN — Medication 10 ML: at 16:51

## 2019-05-03 RX ADMIN — Medication 10 ML: at 16:18

## 2019-05-03 RX ADMIN — Medication 10 ML: at 16:52

## 2019-05-03 RX ADMIN — SODIUM CHLORIDE 1 G: 900 INJECTION, SOLUTION INTRAVENOUS at 16:20

## 2019-05-03 RX ADMIN — Medication 10 ML: at 16:19

## 2019-05-03 RX ADMIN — Medication 500 UNITS: at 16:52

## 2019-05-03 NOTE — PROGRESS NOTES
Outpatient Infusion Center Short Visit Progress Note 1610 Pt admit to [facility] for Vivino Arts ambulatory in stable condition. Assessment completed. No new concerns voiced. Patient Vitals for the past 12 hrs: 
 Temp Pulse Resp BP  
05/03/19 1610 97.3 °F (36.3 °C) 77 18 159/74 PICC with positive blood return flushed, heparinized and capped per protocol at conclusion. Labs drawn today. Recent Results (from the past 12 hour(s)) CBC WITH AUTOMATED DIFF Collection Time: 05/03/19  4:20 PM  
Result Value Ref Range WBC 7.5 3.6 - 11.0 K/uL  
 RBC 4.58 3.80 - 5.20 M/uL  
 HGB 13.5 11.5 - 16.0 g/dL HCT 42.0 35.0 - 47.0 % MCV 91.7 80.0 - 99.0 FL  
 MCH 29.5 26.0 - 34.0 PG  
 MCHC 32.1 30.0 - 36.5 g/dL  
 RDW 15.0 (H) 11.5 - 14.5 % PLATELET 513 711 - 006 K/uL MPV 11.3 8.9 - 12.9 FL  
 NRBC 0.0 0  WBC ABSOLUTE NRBC 0.00 0.00 - 0.01 K/uL NEUTROPHILS 64 32 - 75 % LYMPHOCYTES 25 12 - 49 % MONOCYTES 6 5 - 13 % EOSINOPHILS 3 0 - 7 % BASOPHILS 1 0 - 1 % IMMATURE GRANULOCYTES 1 (H) 0.0 - 0.5 % ABS. NEUTROPHILS 4.9 1.8 - 8.0 K/UL  
 ABS. LYMPHOCYTES 1.9 0.8 - 3.5 K/UL  
 ABS. MONOCYTES 0.4 0.0 - 1.0 K/UL  
 ABS. EOSINOPHILS 0.2 0.0 - 0.4 K/UL  
 ABS. BASOPHILS 0.1 0.0 - 0.1 K/UL  
 ABS. IMM. GRANS. 0.1 (H) 0.00 - 0.04 K/UL  
 DF AUTOMATED METABOLIC PANEL, COMPREHENSIVE Collection Time: 05/03/19  4:20 PM  
Result Value Ref Range Sodium 141 136 - 145 mmol/L Potassium 3.4 (L) 3.5 - 5.1 mmol/L Chloride 105 97 - 108 mmol/L  
 CO2 29 21 - 32 mmol/L Anion gap 7 5 - 15 mmol/L Glucose 102 (H) 65 - 100 mg/dL BUN 27 (H) 6 - 20 MG/DL Creatinine 0.95 0.55 - 1.02 MG/DL  
 BUN/Creatinine ratio 28 (H) 12 - 20 GFR est AA >60 >60 ml/min/1.73m2 GFR est non-AA 57 (L) >60 ml/min/1.73m2 Calcium 9.4 8.5 - 10.1 MG/DL Bilirubin, total 0.6 0.2 - 1.0 MG/DL  
 ALT (SGPT) 25 12 - 78 U/L  
 AST (SGOT) 21 15 - 37 U/L Alk.  phosphatase 115 45 - 117 U/L  
 Protein, total 6.9 6.4 - 8.2 g/dL Albumin 3.4 (L) 3.5 - 5.0 g/dL Globulin 3.5 2.0 - 4.0 g/dL A-G Ratio 1.0 (L) 1.1 - 2.2 Medications: 
Invanz IV 
 
1700 Pt tolerated treatment well. D/c home ambulatory in no distress. Pt aware of next appointment scheduled for 5/4/19.

## 2019-05-04 ENCOUNTER — HOSPITAL ENCOUNTER (OUTPATIENT)
Dept: INFUSION THERAPY | Age: 79
Discharge: HOME | End: 2019-05-04
Payer: MEDICARE

## 2019-05-04 VITALS
HEART RATE: 65 BPM | RESPIRATION RATE: 18 BRPM | TEMPERATURE: 96.9 F | SYSTOLIC BLOOD PRESSURE: 154 MMHG | DIASTOLIC BLOOD PRESSURE: 68 MMHG

## 2019-05-04 PROCEDURE — 74011000258 HC RX REV CODE- 258: Performed by: INTERNAL MEDICINE

## 2019-05-04 PROCEDURE — 96365 THER/PROPH/DIAG IV INF INIT: CPT

## 2019-05-04 PROCEDURE — 74011250636 HC RX REV CODE- 250/636: Performed by: INTERNAL MEDICINE

## 2019-05-04 RX ORDER — HEPARIN 100 UNIT/ML
500 SYRINGE INTRAVENOUS AS NEEDED
Status: DISCONTINUED | OUTPATIENT
Start: 2019-05-04 | End: 2019-05-05 | Stop reason: HOSPADM

## 2019-05-04 RX ORDER — SODIUM CHLORIDE 0.9 % (FLUSH) 0.9 %
5-10 SYRINGE (ML) INJECTION AS NEEDED
Status: DISCONTINUED | OUTPATIENT
Start: 2019-05-04 | End: 2019-05-05 | Stop reason: HOSPADM

## 2019-05-04 RX ADMIN — Medication 10 ML: at 10:49

## 2019-05-04 RX ADMIN — Medication 10 ML: at 10:18

## 2019-05-04 RX ADMIN — SODIUM CHLORIDE 1 G: 900 INJECTION, SOLUTION INTRAVENOUS at 10:19

## 2019-05-04 RX ADMIN — Medication 500 UNITS: at 10:49

## 2019-05-04 NOTE — PROGRESS NOTES
[facility] VISIT NOTE 
 
1005. Pt arrived at [address] [place] [address] and in no distress for Ertapenem. Assessment completed, pt with no new complaints or concerns. Right Single Lumen PICC flushed with positive blood return noted. Dressing clean, dry and intact. Medications received: 
Ertapenem Tolerated treatment well, no adverse reaction noted. PICC flushed per protocol. Positive blood return noted. Patient Vitals for the past 12 hrs: 
 Temp Pulse Resp BP  
05/04/19 1008 96.9 °F (36.1 °C) 65 18 154/68  
 
1050. D/C'd from [address] [place] [address] and in no distress accompanied by friend.  Next appointment is 5/5/19 at 10:30 am.

## 2019-05-05 ENCOUNTER — HOSPITAL ENCOUNTER (OUTPATIENT)
Dept: INFUSION THERAPY | Age: 79
Discharge: HOME | End: 2019-05-05
Payer: MEDICARE

## 2019-05-05 VITALS
SYSTOLIC BLOOD PRESSURE: 133 MMHG | RESPIRATION RATE: 18 BRPM | HEART RATE: 60 BPM | DIASTOLIC BLOOD PRESSURE: 60 MMHG | TEMPERATURE: 97.8 F

## 2019-05-05 PROCEDURE — 74011000258 HC RX REV CODE- 258: Performed by: INTERNAL MEDICINE

## 2019-05-05 PROCEDURE — 74011250636 HC RX REV CODE- 250/636: Performed by: INTERNAL MEDICINE

## 2019-05-05 PROCEDURE — 96365 THER/PROPH/DIAG IV INF INIT: CPT

## 2019-05-05 RX ORDER — SODIUM CHLORIDE 0.9 % (FLUSH) 0.9 %
5-10 SYRINGE (ML) INJECTION AS NEEDED
Status: DISCONTINUED | OUTPATIENT
Start: 2019-05-05 | End: 2019-05-06 | Stop reason: HOSPADM

## 2019-05-05 RX ORDER — HEPARIN 100 UNIT/ML
500 SYRINGE INTRAVENOUS AS NEEDED
Status: DISCONTINUED | OUTPATIENT
Start: 2019-05-05 | End: 2019-05-06 | Stop reason: HOSPADM

## 2019-05-05 RX ADMIN — Medication 500 UNITS: at 10:30

## 2019-05-05 RX ADMIN — SODIUM CHLORIDE 1 G: 900 INJECTION, SOLUTION INTRAVENOUS at 09:58

## 2019-05-05 RX ADMIN — Medication 10 ML: at 09:55

## 2019-05-06 ENCOUNTER — HOSPITAL ENCOUNTER (OUTPATIENT)
Dept: INFUSION THERAPY | Age: 79
Discharge: HOME | End: 2019-05-06
Payer: MEDICARE

## 2019-05-06 VITALS
TEMPERATURE: 97 F | HEART RATE: 65 BPM | RESPIRATION RATE: 18 BRPM | SYSTOLIC BLOOD PRESSURE: 145 MMHG | DIASTOLIC BLOOD PRESSURE: 73 MMHG

## 2019-05-06 LAB
ALBUMIN SERPL-MCNC: 3.3 G/DL (ref 3.5–5)
ALBUMIN/GLOB SERPL: 0.8 {RATIO} (ref 1.1–2.2)
ALP SERPL-CCNC: 109 U/L (ref 45–117)
ALT SERPL-CCNC: 31 U/L (ref 12–78)
ANION GAP SERPL CALC-SCNC: 7 MMOL/L (ref 5–15)
AST SERPL-CCNC: 27 U/L (ref 15–37)
BASOPHILS # BLD: 0.1 K/UL (ref 0–0.1)
BASOPHILS NFR BLD: 1 % (ref 0–1)
BILIRUB SERPL-MCNC: 0.6 MG/DL (ref 0.2–1)
BUN SERPL-MCNC: 25 MG/DL (ref 6–20)
BUN/CREAT SERPL: 28 (ref 12–20)
CALCIUM SERPL-MCNC: 9.2 MG/DL (ref 8.5–10.1)
CHLORIDE SERPL-SCNC: 103 MMOL/L (ref 97–108)
CO2 SERPL-SCNC: 30 MMOL/L (ref 21–32)
CREAT SERPL-MCNC: 0.89 MG/DL (ref 0.55–1.02)
CRP SERPL HS-MCNC: >9.5 MG/L
DIFFERENTIAL METHOD BLD: ABNORMAL
EOSINOPHIL # BLD: 0.2 K/UL (ref 0–0.4)
EOSINOPHIL NFR BLD: 3 % (ref 0–7)
ERYTHROCYTE [DISTWIDTH] IN BLOOD BY AUTOMATED COUNT: 14.9 % (ref 11.5–14.5)
ERYTHROCYTE [SEDIMENTATION RATE] IN BLOOD: 31 MM/HR (ref 0–30)
GLOBULIN SER CALC-MCNC: 4 G/DL (ref 2–4)
GLUCOSE SERPL-MCNC: 95 MG/DL (ref 65–100)
HCT VFR BLD AUTO: 40.7 % (ref 35–47)
HGB BLD-MCNC: 13.5 G/DL (ref 11.5–16)
IMM GRANULOCYTES # BLD AUTO: 0.1 K/UL (ref 0–0.04)
IMM GRANULOCYTES NFR BLD AUTO: 1 % (ref 0–0.5)
LYMPHOCYTES # BLD: 1.9 K/UL (ref 0.8–3.5)
LYMPHOCYTES NFR BLD: 28 % (ref 12–49)
MCH RBC QN AUTO: 30.1 PG (ref 26–34)
MCHC RBC AUTO-ENTMCNC: 33.2 G/DL (ref 30–36.5)
MCV RBC AUTO: 90.8 FL (ref 80–99)
MONOCYTES # BLD: 0.4 K/UL (ref 0–1)
MONOCYTES NFR BLD: 6 % (ref 5–13)
NEUTS SEG # BLD: 4.2 K/UL (ref 1.8–8)
NEUTS SEG NFR BLD: 61 % (ref 32–75)
NRBC # BLD: 0 K/UL (ref 0–0.01)
NRBC BLD-RTO: 0 PER 100 WBC
PLATELET # BLD AUTO: 179 K/UL (ref 150–400)
PMV BLD AUTO: 11.4 FL (ref 8.9–12.9)
POTASSIUM SERPL-SCNC: 3.2 MMOL/L (ref 3.5–5.1)
PROT SERPL-MCNC: 7.3 G/DL (ref 6.4–8.2)
RBC # BLD AUTO: 4.48 M/UL (ref 3.8–5.2)
SODIUM SERPL-SCNC: 140 MMOL/L (ref 136–145)
WBC # BLD AUTO: 6.8 K/UL (ref 3.6–11)

## 2019-05-06 PROCEDURE — 74011250636 HC RX REV CODE- 250/636: Performed by: INTERNAL MEDICINE

## 2019-05-06 PROCEDURE — 86141 C-REACTIVE PROTEIN HS: CPT

## 2019-05-06 PROCEDURE — 85025 COMPLETE CBC W/AUTO DIFF WBC: CPT

## 2019-05-06 PROCEDURE — 80053 COMPREHEN METABOLIC PANEL: CPT

## 2019-05-06 PROCEDURE — 74011000258 HC RX REV CODE- 258: Performed by: INTERNAL MEDICINE

## 2019-05-06 PROCEDURE — 85652 RBC SED RATE AUTOMATED: CPT

## 2019-05-06 PROCEDURE — 96365 THER/PROPH/DIAG IV INF INIT: CPT

## 2019-05-06 PROCEDURE — 36415 COLL VENOUS BLD VENIPUNCTURE: CPT

## 2019-05-06 RX ORDER — HEPARIN 100 UNIT/ML
500 SYRINGE INTRAVENOUS AS NEEDED
Status: DISCONTINUED | OUTPATIENT
Start: 2019-05-06 | End: 2019-05-07 | Stop reason: HOSPADM

## 2019-05-06 RX ORDER — SODIUM CHLORIDE 0.9 % (FLUSH) 0.9 %
5-10 SYRINGE (ML) INJECTION AS NEEDED
Status: DISCONTINUED | OUTPATIENT
Start: 2019-05-06 | End: 2019-05-07 | Stop reason: HOSPADM

## 2019-05-06 RX ADMIN — Medication 10 ML: at 16:20

## 2019-05-06 RX ADMIN — Medication 500 UNITS: at 16:20

## 2019-05-06 RX ADMIN — SODIUM CHLORIDE 1 G: 900 INJECTION, SOLUTION INTRAVENOUS at 15:57

## 2019-05-06 NOTE — PROGRESS NOTES
Outpatient Infusion Center Short Visit Progress Note 1545 Pt admit to [facility] for [address] ambulatory in stable condition. Assessment completed. No new concerns voiced. Please review pending lab results in [address] [place] [address]. Patient Vitals for the past 12 hrs: 
 Temp Pulse Resp BP  
05/06/19 1548 97 °F (36.1 °C) 65 18 145/73 PICC with positive blood return flushed, heparinized and capped per protocol dressing changed. Medications: 
[address] 1620 Pt tolerated treatment well. D/c home ambulatory in no distress. Pt aware of next appointment scheduled for 5/7/19.

## 2019-05-07 ENCOUNTER — HOSPITAL ENCOUNTER (OUTPATIENT)
Dept: INFUSION THERAPY | Age: 79
Discharge: HOME | End: 2019-05-07
Payer: MEDICARE

## 2019-05-07 VITALS
SYSTOLIC BLOOD PRESSURE: 146 MMHG | DIASTOLIC BLOOD PRESSURE: 67 MMHG | HEART RATE: 69 BPM | RESPIRATION RATE: 18 BRPM | TEMPERATURE: 97 F

## 2019-05-07 LAB — CRP SERPL-MCNC: 0.91 MG/DL (ref 0–0.6)

## 2019-05-07 PROCEDURE — 74011250636 HC RX REV CODE- 250/636: Performed by: INTERNAL MEDICINE

## 2019-05-07 PROCEDURE — 86140 C-REACTIVE PROTEIN: CPT

## 2019-05-07 PROCEDURE — 36415 COLL VENOUS BLD VENIPUNCTURE: CPT

## 2019-05-07 PROCEDURE — 96365 THER/PROPH/DIAG IV INF INIT: CPT

## 2019-05-07 PROCEDURE — 74011000258 HC RX REV CODE- 258: Performed by: INTERNAL MEDICINE

## 2019-05-07 RX ADMIN — SODIUM CHLORIDE 1 G: 900 INJECTION, SOLUTION INTRAVENOUS at 16:00

## 2019-05-07 NOTE — PROGRESS NOTES
Outpatient Infusion Center Short Visit Progress Note 1550 Pt admit to [facility] for Dulce Collado ambulatory in stable condition. Assessment completed. No new concerns voiced. Please review pending lab results in [address] [place] [address]. Patient Vitals for the past 12 hrs: 
 Temp Pulse Resp BP  
05/07/19 1556 97 °F (36.1 °C) 69 18 146/67 PICC with positive blood return flushed, heparinized and capped per protocol dressing changed. Medications: 
Dulce Collado 1635 Pt tolerated treatment well. D/c home ambulatory in no distress. Pt aware of next appointment scheduled for 5/8/19.

## 2019-05-08 ENCOUNTER — HOSPITAL ENCOUNTER (OUTPATIENT)
Dept: INFUSION THERAPY | Age: 79
Discharge: HOME | End: 2019-05-08
Payer: MEDICARE

## 2019-05-08 VITALS
RESPIRATION RATE: 18 BRPM | DIASTOLIC BLOOD PRESSURE: 68 MMHG | HEART RATE: 69 BPM | SYSTOLIC BLOOD PRESSURE: 141 MMHG | TEMPERATURE: 98 F

## 2019-05-08 PROCEDURE — 74011250636 HC RX REV CODE- 250/636: Performed by: INTERNAL MEDICINE

## 2019-05-08 PROCEDURE — 96365 THER/PROPH/DIAG IV INF INIT: CPT

## 2019-05-08 PROCEDURE — 77030020847 HC STATLOK BARD -A

## 2019-05-08 PROCEDURE — 74011000258 HC RX REV CODE- 258: Performed by: INTERNAL MEDICINE

## 2019-05-08 RX ORDER — SODIUM CHLORIDE 9 MG/ML
10 INJECTION INTRAMUSCULAR; INTRAVENOUS; SUBCUTANEOUS AS NEEDED
Status: DISCONTINUED | OUTPATIENT
Start: 2019-05-08 | End: 2019-05-09 | Stop reason: HOSPADM

## 2019-05-08 RX ORDER — HEPARIN 100 UNIT/ML
500 SYRINGE INTRAVENOUS AS NEEDED
Status: DISCONTINUED | OUTPATIENT
Start: 2019-05-08 | End: 2019-05-09 | Stop reason: HOSPADM

## 2019-05-08 RX ADMIN — SODIUM CHLORIDE 10 ML: 9 INJECTION INTRAMUSCULAR; INTRAVENOUS; SUBCUTANEOUS at 16:30

## 2019-05-08 RX ADMIN — Medication 500 UNITS: at 16:30

## 2019-05-08 RX ADMIN — SODIUM CHLORIDE 1 G: 900 INJECTION, SOLUTION INTRAVENOUS at 15:55

## 2019-05-08 NOTE — PROGRESS NOTES
Outpatient Infusion Center Short Visit Progress Note 1555 Pt admit to [facility] for [address] ambulatory in stable condition. Assessment completed. No new concerns voiced. Patient Vitals for the past 12 hrs: 
 Temp Pulse Resp BP  
05/08/19 1554 98 °F (36.7 °C) 69 18 141/68 PICC with positive blood return flushed, heparinized and capped per protocol dressing changed. Dressing changed per protocol Medications: 
[address] 1645 Pt tolerated treatment well. D/c home ambulatory in no distress. Pt aware of next appointment scheduled for 5/9/19.

## 2019-05-09 ENCOUNTER — HOSPITAL ENCOUNTER (OUTPATIENT)
Dept: INFUSION THERAPY | Age: 79
Discharge: HOME | End: 2019-05-09
Payer: MEDICARE

## 2019-05-09 VITALS
DIASTOLIC BLOOD PRESSURE: 64 MMHG | OXYGEN SATURATION: 98 % | TEMPERATURE: 97.5 F | SYSTOLIC BLOOD PRESSURE: 121 MMHG | RESPIRATION RATE: 18 BRPM | HEART RATE: 73 BPM

## 2019-05-09 LAB
ALBUMIN SERPL-MCNC: 3.4 G/DL (ref 3.5–5)
ALBUMIN/GLOB SERPL: 0.9 {RATIO} (ref 1.1–2.2)
ALP SERPL-CCNC: 113 U/L (ref 45–117)
ALT SERPL-CCNC: 32 U/L (ref 12–78)
ANION GAP SERPL CALC-SCNC: 7 MMOL/L (ref 5–15)
AST SERPL-CCNC: 28 U/L (ref 15–37)
BASOPHILS # BLD: 0.1 K/UL (ref 0–0.1)
BASOPHILS NFR BLD: 1 % (ref 0–1)
BILIRUB SERPL-MCNC: 0.6 MG/DL (ref 0.2–1)
BUN SERPL-MCNC: 25 MG/DL (ref 6–20)
BUN/CREAT SERPL: 28 (ref 12–20)
CALCIUM SERPL-MCNC: 9 MG/DL (ref 8.5–10.1)
CHLORIDE SERPL-SCNC: 104 MMOL/L (ref 97–108)
CO2 SERPL-SCNC: 29 MMOL/L (ref 21–32)
CREAT SERPL-MCNC: 0.9 MG/DL (ref 0.55–1.02)
DIFFERENTIAL METHOD BLD: ABNORMAL
EOSINOPHIL # BLD: 0.2 K/UL (ref 0–0.4)
EOSINOPHIL NFR BLD: 4 % (ref 0–7)
ERYTHROCYTE [DISTWIDTH] IN BLOOD BY AUTOMATED COUNT: 15.1 % (ref 11.5–14.5)
GLOBULIN SER CALC-MCNC: 3.7 G/DL (ref 2–4)
GLUCOSE SERPL-MCNC: 93 MG/DL (ref 65–100)
HCT VFR BLD AUTO: 42 % (ref 35–47)
HGB BLD-MCNC: 13.5 G/DL (ref 11.5–16)
IMM GRANULOCYTES # BLD AUTO: 0.1 K/UL (ref 0–0.04)
IMM GRANULOCYTES NFR BLD AUTO: 1 % (ref 0–0.5)
LYMPHOCYTES # BLD: 1.6 K/UL (ref 0.8–3.5)
LYMPHOCYTES NFR BLD: 24 % (ref 12–49)
MCH RBC QN AUTO: 29.5 PG (ref 26–34)
MCHC RBC AUTO-ENTMCNC: 32.1 G/DL (ref 30–36.5)
MCV RBC AUTO: 91.9 FL (ref 80–99)
MONOCYTES # BLD: 0.4 K/UL (ref 0–1)
MONOCYTES NFR BLD: 6 % (ref 5–13)
NEUTS SEG # BLD: 4.4 K/UL (ref 1.8–8)
NEUTS SEG NFR BLD: 64 % (ref 32–75)
NRBC # BLD: 0 K/UL (ref 0–0.01)
NRBC BLD-RTO: 0 PER 100 WBC
PLATELET # BLD AUTO: 172 K/UL (ref 150–400)
PMV BLD AUTO: 11.3 FL (ref 8.9–12.9)
POTASSIUM SERPL-SCNC: 3.4 MMOL/L (ref 3.5–5.1)
PROT SERPL-MCNC: 7.1 G/DL (ref 6.4–8.2)
RBC # BLD AUTO: 4.57 M/UL (ref 3.8–5.2)
SODIUM SERPL-SCNC: 140 MMOL/L (ref 136–145)
WBC # BLD AUTO: 6.8 K/UL (ref 3.6–11)

## 2019-05-09 PROCEDURE — 74011000258 HC RX REV CODE- 258: Performed by: INTERNAL MEDICINE

## 2019-05-09 PROCEDURE — 80053 COMPREHEN METABOLIC PANEL: CPT

## 2019-05-09 PROCEDURE — 36415 COLL VENOUS BLD VENIPUNCTURE: CPT

## 2019-05-09 PROCEDURE — 85025 COMPLETE CBC W/AUTO DIFF WBC: CPT

## 2019-05-09 PROCEDURE — 74011250636 HC RX REV CODE- 250/636: Performed by: INTERNAL MEDICINE

## 2019-05-09 PROCEDURE — 96365 THER/PROPH/DIAG IV INF INIT: CPT

## 2019-05-09 RX ADMIN — SODIUM CHLORIDE 1 G: 900 INJECTION, SOLUTION INTRAVENOUS at 16:18

## 2019-05-10 ENCOUNTER — HOSPITAL ENCOUNTER (OUTPATIENT)
Dept: INFUSION THERAPY | Age: 79
Discharge: HOME | End: 2019-05-10
Payer: MEDICARE

## 2019-05-10 VITALS
DIASTOLIC BLOOD PRESSURE: 72 MMHG | HEART RATE: 70 BPM | SYSTOLIC BLOOD PRESSURE: 149 MMHG | RESPIRATION RATE: 18 BRPM | TEMPERATURE: 97 F

## 2019-05-10 PROCEDURE — 96365 THER/PROPH/DIAG IV INF INIT: CPT

## 2019-05-10 PROCEDURE — 74011250636 HC RX REV CODE- 250/636: Performed by: INTERNAL MEDICINE

## 2019-05-10 PROCEDURE — 74011000258 HC RX REV CODE- 258: Performed by: INTERNAL MEDICINE

## 2019-05-10 RX ADMIN — SODIUM CHLORIDE 1 G: 900 INJECTION, SOLUTION INTRAVENOUS at 16:10

## 2019-05-10 NOTE — PROGRESS NOTES
Outpatient Infusion Center - Chemotherapy Progress Note 1600 Pt admit to [facility] for daily Invanz ambulatory in stable condition accompanied by spouse. Assessment completed. No new concerns voiced. PICC line flushed with positive blood return. Visit Vitals /72 Pulse 70 Temp 97 °F (36.1 °C) Resp 18 Medications: 
Invanz 1 g 
 
1645 Pt tolerated treatment well. D/c home ambulatory in no distress. Pt aware of next [facility] appointment scheduled for 05/11/2019.

## 2019-05-11 ENCOUNTER — HOSPITAL ENCOUNTER (OUTPATIENT)
Dept: INFUSION THERAPY | Age: 79
Discharge: HOME | End: 2019-05-11
Payer: MEDICARE

## 2019-05-11 VITALS
HEART RATE: 75 BPM | DIASTOLIC BLOOD PRESSURE: 75 MMHG | RESPIRATION RATE: 16 BRPM | SYSTOLIC BLOOD PRESSURE: 132 MMHG | OXYGEN SATURATION: 94 % | TEMPERATURE: 97.4 F

## 2019-05-11 PROCEDURE — 74011250636 HC RX REV CODE- 250/636: Performed by: INTERNAL MEDICINE

## 2019-05-11 PROCEDURE — 96365 THER/PROPH/DIAG IV INF INIT: CPT

## 2019-05-11 PROCEDURE — 74011000258 HC RX REV CODE- 258: Performed by: INTERNAL MEDICINE

## 2019-05-11 RX ADMIN — ERTAPENEM SODIUM 1 G: 1 INJECTION, POWDER, LYOPHILIZED, FOR SOLUTION INTRAMUSCULAR; INTRAVENOUS at 10:24

## 2019-05-11 NOTE — PROGRESS NOTES
Outpatient Infusion Center Short Visit Progress Note 1020 Patient admitted to [facility] for [facility] in stable condition. Assessment completed. No new concerns voiced. PICC flushed well with positive blood return. Visit Vitals /75 (BP 1 Location: Left arm, BP Patient Position: Sitting) Pulse 75 Temp 97.4 °F (36.3 °C) Resp 16 SpO2 94% Medications: 
Invanz IV 
 
1100 Patient tolerated treatment well. PICC flushed well post treatment,and heparinized per protocol. Patient discharged from [place] [address] ambulatory in no distress at 1100. Patient aware of next appointment scheduled for 5/12/19.

## 2019-05-12 ENCOUNTER — HOSPITAL ENCOUNTER (OUTPATIENT)
Dept: INFUSION THERAPY | Age: 79
Discharge: HOME | End: 2019-05-12
Payer: MEDICARE

## 2019-05-12 VITALS
RESPIRATION RATE: 16 BRPM | DIASTOLIC BLOOD PRESSURE: 79 MMHG | SYSTOLIC BLOOD PRESSURE: 141 MMHG | TEMPERATURE: 97.1 F | HEART RATE: 64 BPM

## 2019-05-12 PROCEDURE — 74011000258 HC RX REV CODE- 258: Performed by: INTERNAL MEDICINE

## 2019-05-12 PROCEDURE — 74011250636 HC RX REV CODE- 250/636: Performed by: INTERNAL MEDICINE

## 2019-05-12 PROCEDURE — 96365 THER/PROPH/DIAG IV INF INIT: CPT

## 2019-05-12 RX ORDER — HEPARIN 100 UNIT/ML
500 SYRINGE INTRAVENOUS AS NEEDED
Status: DISCONTINUED | OUTPATIENT
Start: 2019-05-12 | End: 2019-05-13 | Stop reason: HOSPADM

## 2019-05-12 RX ORDER — SODIUM CHLORIDE 0.9 % (FLUSH) 0.9 %
5-10 SYRINGE (ML) INJECTION AS NEEDED
Status: DISCONTINUED | OUTPATIENT
Start: 2019-05-12 | End: 2019-05-13 | Stop reason: HOSPADM

## 2019-05-12 RX ADMIN — ERTAPENEM SODIUM 1 G: 1 INJECTION, POWDER, LYOPHILIZED, FOR SOLUTION INTRAMUSCULAR; INTRAVENOUS at 09:52

## 2019-05-12 RX ADMIN — Medication 10 ML: at 10:19

## 2019-05-12 RX ADMIN — Medication 500 UNITS: at 10:19

## 2019-05-12 RX ADMIN — Medication 10 ML: at 09:53

## 2019-05-12 NOTE — PROGRESS NOTES
0950 Pt arrived ambulatory and in no distress for invanz IV. Assessment completed. No new complaints voiced. R single lumen picc flushed with + blood return. Vital Signs Patient Vitals for the past 12 hrs: 
 Temp Pulse Resp BP  
05/12/19 0951 97.1 °F (36.2 °C) 64 16 141/79 Medications: 
Invanz 1g IV over 30 mins 1020:  Discharged home ambulatory and in no distress, accompanied by .  Next appointment 5/13/19 at 4pm.

## 2019-05-13 ENCOUNTER — HOSPITAL ENCOUNTER (OUTPATIENT)
Dept: INFUSION THERAPY | Age: 79
Discharge: HOME | End: 2019-05-13
Payer: MEDICARE

## 2019-05-13 VITALS
TEMPERATURE: 97 F | SYSTOLIC BLOOD PRESSURE: 124 MMHG | DIASTOLIC BLOOD PRESSURE: 61 MMHG | HEART RATE: 58 BPM | RESPIRATION RATE: 16 BRPM

## 2019-05-13 LAB
ALBUMIN SERPL-MCNC: 3.5 G/DL (ref 3.5–5)
ALBUMIN/GLOB SERPL: 1 {RATIO} (ref 1.1–2.2)
ALP SERPL-CCNC: 122 U/L (ref 45–117)
ALT SERPL-CCNC: 32 U/L (ref 12–78)
ANION GAP SERPL CALC-SCNC: 4 MMOL/L (ref 5–15)
AST SERPL-CCNC: 26 U/L (ref 15–37)
BASOPHILS # BLD: 0.1 K/UL (ref 0–0.1)
BASOPHILS NFR BLD: 1 % (ref 0–1)
BILIRUB SERPL-MCNC: 0.7 MG/DL (ref 0.2–1)
BUN SERPL-MCNC: 20 MG/DL (ref 6–20)
BUN/CREAT SERPL: 25 (ref 12–20)
CALCIUM SERPL-MCNC: 9.3 MG/DL (ref 8.5–10.1)
CHLORIDE SERPL-SCNC: 105 MMOL/L (ref 97–108)
CO2 SERPL-SCNC: 30 MMOL/L (ref 21–32)
CREAT SERPL-MCNC: 0.81 MG/DL (ref 0.55–1.02)
CRP SERPL-MCNC: 1.34 MG/DL (ref 0–0.6)
DIFFERENTIAL METHOD BLD: ABNORMAL
EOSINOPHIL # BLD: 0.2 K/UL (ref 0–0.4)
EOSINOPHIL NFR BLD: 3 % (ref 0–7)
ERYTHROCYTE [DISTWIDTH] IN BLOOD BY AUTOMATED COUNT: 15.1 % (ref 11.5–14.5)
ERYTHROCYTE [SEDIMENTATION RATE] IN BLOOD: 40 MM/HR (ref 0–30)
GLOBULIN SER CALC-MCNC: 3.4 G/DL (ref 2–4)
GLUCOSE SERPL-MCNC: 85 MG/DL (ref 65–100)
HCT VFR BLD AUTO: 41.6 % (ref 35–47)
HGB BLD-MCNC: 13.8 G/DL (ref 11.5–16)
IMM GRANULOCYTES # BLD AUTO: 0.1 K/UL (ref 0–0.04)
IMM GRANULOCYTES NFR BLD AUTO: 2 % (ref 0–0.5)
LYMPHOCYTES # BLD: 1.9 K/UL (ref 0.8–3.5)
LYMPHOCYTES NFR BLD: 30 % (ref 12–49)
MCH RBC QN AUTO: 30.5 PG (ref 26–34)
MCHC RBC AUTO-ENTMCNC: 33.2 G/DL (ref 30–36.5)
MCV RBC AUTO: 91.8 FL (ref 80–99)
MONOCYTES # BLD: 0.5 K/UL (ref 0–1)
MONOCYTES NFR BLD: 7 % (ref 5–13)
NEUTS SEG # BLD: 3.7 K/UL (ref 1.8–8)
NEUTS SEG NFR BLD: 57 % (ref 32–75)
NRBC # BLD: 0 K/UL (ref 0–0.01)
NRBC BLD-RTO: 0 PER 100 WBC
PLATELET # BLD AUTO: 166 K/UL (ref 150–400)
PMV BLD AUTO: 11.5 FL (ref 8.9–12.9)
POTASSIUM SERPL-SCNC: 3.6 MMOL/L (ref 3.5–5.1)
PROT SERPL-MCNC: 6.9 G/DL (ref 6.4–8.2)
RBC # BLD AUTO: 4.53 M/UL (ref 3.8–5.2)
SODIUM SERPL-SCNC: 139 MMOL/L (ref 136–145)
WBC # BLD AUTO: 6.5 K/UL (ref 3.6–11)

## 2019-05-13 PROCEDURE — 86140 C-REACTIVE PROTEIN: CPT

## 2019-05-13 PROCEDURE — 74011250636 HC RX REV CODE- 250/636: Performed by: INTERNAL MEDICINE

## 2019-05-13 PROCEDURE — 85025 COMPLETE CBC W/AUTO DIFF WBC: CPT

## 2019-05-13 PROCEDURE — 85652 RBC SED RATE AUTOMATED: CPT

## 2019-05-13 PROCEDURE — 80053 COMPREHEN METABOLIC PANEL: CPT

## 2019-05-13 PROCEDURE — 36415 COLL VENOUS BLD VENIPUNCTURE: CPT

## 2019-05-13 PROCEDURE — 74011000258 HC RX REV CODE- 258: Performed by: INTERNAL MEDICINE

## 2019-05-13 PROCEDURE — 96365 THER/PROPH/DIAG IV INF INIT: CPT

## 2019-05-13 RX ORDER — HEPARIN 100 UNIT/ML
500 SYRINGE INTRAVENOUS AS NEEDED
Status: DISCONTINUED | OUTPATIENT
Start: 2019-05-13 | End: 2019-05-14 | Stop reason: HOSPADM

## 2019-05-13 RX ORDER — SODIUM CHLORIDE 0.9 % (FLUSH) 0.9 %
10-40 SYRINGE (ML) INJECTION AS NEEDED
Status: DISCONTINUED | OUTPATIENT
Start: 2019-05-13 | End: 2019-05-14 | Stop reason: HOSPADM

## 2019-05-13 RX ADMIN — ERTAPENEM SODIUM 1 G: 1 INJECTION, POWDER, LYOPHILIZED, FOR SOLUTION INTRAMUSCULAR; INTRAVENOUS at 15:04

## 2019-05-13 RX ADMIN — Medication 10 ML: at 15:34

## 2019-05-13 RX ADMIN — Medication 20 ML: at 15:03

## 2019-05-13 RX ADMIN — Medication 500 UNITS: at 15:34

## 2019-05-13 NOTE — PROGRESS NOTES
Outpatient Infusion Center Progress Note 1450 Pt admit to [facility] for daily Invanz ambulatory in stable condition. Assessment completed. No new concerns voiced. PICC flushed with positive blood return. Labs drawn and sent for processing. PICC flushed and Invanz started as ordered. Visit Vitals /61 (BP 1 Location: Left arm, BP Patient Position: Sitting) Pulse (!) 58 Temp 97 °F (36.1 °C) Resp 16 Medications: 
Attila Louise NS flushes Heparin 1535 Pt tolerated treatment well. PICC maintained positive blood return throughout treatment. PICC flushed, heparinized and capped for tomorrow's infusion. D/c home ambulatory in no distress. Pt aware of next appointment scheduled for 5/14/19. Recent Results (from the past 12 hour(s)) CBC WITH AUTOMATED DIFF Collection Time: 05/13/19  3:03 PM  
Result Value Ref Range WBC 6.5 3.6 - 11.0 K/uL  
 RBC 4.53 3.80 - 5.20 M/uL  
 HGB 13.8 11.5 - 16.0 g/dL HCT 41.6 35.0 - 47.0 % MCV 91.8 80.0 - 99.0 FL  
 MCH 30.5 26.0 - 34.0 PG  
 MCHC 33.2 30.0 - 36.5 g/dL  
 RDW 15.1 (H) 11.5 - 14.5 % PLATELET 954 474 - 022 K/uL MPV 11.5 8.9 - 12.9 FL  
 NRBC 0.0 0  WBC ABSOLUTE NRBC 0.00 0.00 - 0.01 K/uL NEUTROPHILS 57 32 - 75 % LYMPHOCYTES 30 12 - 49 % MONOCYTES 7 5 - 13 % EOSINOPHILS 3 0 - 7 % BASOPHILS 1 0 - 1 % IMMATURE GRANULOCYTES 2 (H) 0.0 - 0.5 % ABS. NEUTROPHILS 3.7 1.8 - 8.0 K/UL  
 ABS. LYMPHOCYTES 1.9 0.8 - 3.5 K/UL  
 ABS. MONOCYTES 0.5 0.0 - 1.0 K/UL  
 ABS. EOSINOPHILS 0.2 0.0 - 0.4 K/UL  
 ABS. BASOPHILS 0.1 0.0 - 0.1 K/UL  
 ABS. IMM. GRANS. 0.1 (H) 0.00 - 0.04 K/UL  
 DF AUTOMATED METABOLIC PANEL, COMPREHENSIVE Collection Time: 05/13/19  3:03 PM  
Result Value Ref Range Sodium 139 136 - 145 mmol/L Potassium 3.6 3.5 - 5.1 mmol/L Chloride 105 97 - 108 mmol/L  
 CO2 30 21 - 32 mmol/L Anion gap 4 (L) 5 - 15 mmol/L Glucose 85 65 - 100 mg/dL  BUN 20 6 - 20 MG/DL  
 Creatinine 0.81 0.55 - 1.02 MG/DL  
 BUN/Creatinine ratio 25 (H) 12 - 20 GFR est AA >60 >60 ml/min/1.73m2 GFR est non-AA >60 >60 ml/min/1.73m2 Calcium 9.3 8.5 - 10.1 MG/DL Bilirubin, total 0.7 0.2 - 1.0 MG/DL  
 ALT (SGPT) 32 12 - 78 U/L  
 AST (SGOT) 26 15 - 37 U/L Alk. phosphatase 122 (H) 45 - 117 U/L Protein, total 6.9 6.4 - 8.2 g/dL Albumin 3.5 3.5 - 5.0 g/dL Globulin 3.4 2.0 - 4.0 g/dL A-G Ratio 1.0 (L) 1.1 - 2.2 SED RATE (ESR) Collection Time: 05/13/19  3:03 PM  
Result Value Ref Range Sed rate, automated 40 (H) 0 - 30 mm/hr C REACTIVE PROTEIN, QT Collection Time: 05/13/19  3:03 PM  
Result Value Ref Range C-Reactive protein 1.34 (H) 0.00 - 0.60 mg/dL

## 2019-05-14 ENCOUNTER — HOSPITAL ENCOUNTER (OUTPATIENT)
Dept: INFUSION THERAPY | Age: 79
Discharge: HOME | End: 2019-05-14
Payer: MEDICARE

## 2019-05-14 VITALS
DIASTOLIC BLOOD PRESSURE: 65 MMHG | HEART RATE: 66 BPM | TEMPERATURE: 97.4 F | RESPIRATION RATE: 16 BRPM | SYSTOLIC BLOOD PRESSURE: 137 MMHG

## 2019-05-14 PROCEDURE — 96365 THER/PROPH/DIAG IV INF INIT: CPT

## 2019-05-14 PROCEDURE — 74011000258 HC RX REV CODE- 258: Performed by: INTERNAL MEDICINE

## 2019-05-14 PROCEDURE — 74011250636 HC RX REV CODE- 250/636: Performed by: INTERNAL MEDICINE

## 2019-05-14 RX ORDER — HEPARIN 100 UNIT/ML
500 SYRINGE INTRAVENOUS AS NEEDED
Status: DISCONTINUED | OUTPATIENT
Start: 2019-05-14 | End: 2019-05-15 | Stop reason: HOSPADM

## 2019-05-14 RX ORDER — SODIUM CHLORIDE 0.9 % (FLUSH) 0.9 %
10-40 SYRINGE (ML) INJECTION AS NEEDED
Status: DISCONTINUED | OUTPATIENT
Start: 2019-05-14 | End: 2019-05-15 | Stop reason: HOSPADM

## 2019-05-14 RX ADMIN — SODIUM CHLORIDE 1 G: 900 INJECTION, SOLUTION INTRAVENOUS at 16:09

## 2019-05-14 RX ADMIN — Medication 20 ML: at 16:49

## 2019-05-14 RX ADMIN — Medication 10 ML: at 16:06

## 2019-05-14 RX ADMIN — Medication 500 UNITS: at 16:49

## 2019-05-14 NOTE — PROGRESS NOTES
1605 Pt admit to [facility] for daily Invanz ambulatory in stable condition. Assessment completed. No new concerns voiced. PICC flushed with positive blood return and Invanz started as ordered.  
  
Visit Vitals /61 (BP 1 Location: Left arm, BP Patient Position: Sitting) Pulse (!) 58 Temp 97 °F (36.1 °C) Resp 16  
  
  
Medications: 
Milton Anh NS flushes Heparin  
  
1650 Pt tolerated treatment well. PICC maintained positive blood return throughout treatment. PICC flushed, heparinized and capped for tomorrow's infusion. D/c home ambulatory in no distress.  Pt aware of next appointment scheduled for 5/15/19.

## 2019-05-15 ENCOUNTER — HOSPITAL ENCOUNTER (OUTPATIENT)
Dept: INFUSION THERAPY | Age: 79
Discharge: HOME | End: 2019-05-15
Payer: MEDICARE

## 2019-05-15 VITALS
DIASTOLIC BLOOD PRESSURE: 63 MMHG | OXYGEN SATURATION: 95 % | SYSTOLIC BLOOD PRESSURE: 134 MMHG | TEMPERATURE: 96.7 F | RESPIRATION RATE: 16 BRPM | HEART RATE: 62 BPM

## 2019-05-15 PROCEDURE — 74011250636 HC RX REV CODE- 250/636: Performed by: INTERNAL MEDICINE

## 2019-05-15 PROCEDURE — 74011000258 HC RX REV CODE- 258: Performed by: INTERNAL MEDICINE

## 2019-05-15 PROCEDURE — 77030020847 HC STATLOK BARD -A

## 2019-05-15 PROCEDURE — 96365 THER/PROPH/DIAG IV INF INIT: CPT

## 2019-05-15 RX ORDER — SODIUM CHLORIDE 0.9 % (FLUSH) 0.9 %
10-40 SYRINGE (ML) INJECTION AS NEEDED
Status: DISCONTINUED | OUTPATIENT
Start: 2019-05-15 | End: 2019-05-16 | Stop reason: HOSPADM

## 2019-05-15 RX ORDER — HEPARIN 100 UNIT/ML
500 SYRINGE INTRAVENOUS AS NEEDED
Status: DISCONTINUED | OUTPATIENT
Start: 2019-05-15 | End: 2019-05-16 | Stop reason: HOSPADM

## 2019-05-15 RX ADMIN — Medication 10 ML: at 16:54

## 2019-05-15 RX ADMIN — Medication 500 UNITS: at 16:55

## 2019-05-15 RX ADMIN — SODIUM CHLORIDE 1 G: 900 INJECTION, SOLUTION INTRAVENOUS at 16:22

## 2019-05-15 RX ADMIN — Medication 10 ML: at 16:20

## 2019-05-15 RX ADMIN — Medication 10 ML: at 16:53

## 2019-05-15 NOTE — PROGRESS NOTES
1600 Pt admit to [facility] for daily Invanz ambulatory in stable condition. Assessment completed. No new concerns voiced. PICC dressing changed per protocol, Stat Andrew replaced, end clave changed. PICC flushed with positive blood return and Invanz started as ordered.  
  
Visit Vitals /61 (BP 1 Location: Left arm, BP Patient Position: Sitting) Pulse (!) 58 Temp 97 °F (36.1 °C) Resp 16  
  
  
Medications: 
NS Flush Delroy Keas Heparin  
  
1655 Pt tolerated treatment well. PICC maintained positive blood return throughout treatment. PICC flushed, heparinized and Curos Cap applied to end clave. D/c home ambulatory in no distress.  Pt aware of next appointment scheduled for 5/16/19.

## 2019-05-16 ENCOUNTER — HOSPITAL ENCOUNTER (OUTPATIENT)
Dept: INFUSION THERAPY | Age: 79
Discharge: HOME | End: 2019-05-16
Payer: MEDICARE

## 2019-05-16 VITALS
RESPIRATION RATE: 18 BRPM | SYSTOLIC BLOOD PRESSURE: 149 MMHG | DIASTOLIC BLOOD PRESSURE: 74 MMHG | HEART RATE: 84 BPM | TEMPERATURE: 97 F

## 2019-05-16 LAB
ALBUMIN SERPL-MCNC: 3.2 G/DL (ref 3.5–5)
ALBUMIN/GLOB SERPL: 0.8 {RATIO} (ref 1.1–2.2)
ALP SERPL-CCNC: 110 U/L (ref 45–117)
ALT SERPL-CCNC: 32 U/L (ref 12–78)
ANION GAP SERPL CALC-SCNC: 7 MMOL/L (ref 5–15)
AST SERPL-CCNC: 26 U/L (ref 15–37)
BASOPHILS # BLD: 0.1 K/UL (ref 0–0.1)
BASOPHILS NFR BLD: 1 % (ref 0–1)
BILIRUB SERPL-MCNC: 0.6 MG/DL (ref 0.2–1)
BUN SERPL-MCNC: 22 MG/DL (ref 6–20)
BUN/CREAT SERPL: 26 (ref 12–20)
CALCIUM SERPL-MCNC: 9.4 MG/DL (ref 8.5–10.1)
CHLORIDE SERPL-SCNC: 106 MMOL/L (ref 97–108)
CO2 SERPL-SCNC: 29 MMOL/L (ref 21–32)
CREAT SERPL-MCNC: 0.84 MG/DL (ref 0.55–1.02)
DIFFERENTIAL METHOD BLD: ABNORMAL
EOSINOPHIL # BLD: 0.2 K/UL (ref 0–0.4)
EOSINOPHIL NFR BLD: 3 % (ref 0–7)
ERYTHROCYTE [DISTWIDTH] IN BLOOD BY AUTOMATED COUNT: 15.1 % (ref 11.5–14.5)
GLOBULIN SER CALC-MCNC: 3.9 G/DL (ref 2–4)
GLUCOSE SERPL-MCNC: 97 MG/DL (ref 65–100)
HCT VFR BLD AUTO: 41.1 % (ref 35–47)
HGB BLD-MCNC: 13.4 G/DL (ref 11.5–16)
IMM GRANULOCYTES # BLD AUTO: 0.1 K/UL (ref 0–0.04)
IMM GRANULOCYTES NFR BLD AUTO: 1 % (ref 0–0.5)
LYMPHOCYTES # BLD: 1.6 K/UL (ref 0.8–3.5)
LYMPHOCYTES NFR BLD: 28 % (ref 12–49)
MCH RBC QN AUTO: 29.8 PG (ref 26–34)
MCHC RBC AUTO-ENTMCNC: 32.6 G/DL (ref 30–36.5)
MCV RBC AUTO: 91.5 FL (ref 80–99)
MONOCYTES # BLD: 0.4 K/UL (ref 0–1)
MONOCYTES NFR BLD: 7 % (ref 5–13)
NEUTS SEG # BLD: 3.6 K/UL (ref 1.8–8)
NEUTS SEG NFR BLD: 60 % (ref 32–75)
NRBC # BLD: 0 K/UL (ref 0–0.01)
NRBC BLD-RTO: 0 PER 100 WBC
PLATELET # BLD AUTO: 180 K/UL (ref 150–400)
PMV BLD AUTO: 11.4 FL (ref 8.9–12.9)
POTASSIUM SERPL-SCNC: 3.2 MMOL/L (ref 3.5–5.1)
PROT SERPL-MCNC: 7.1 G/DL (ref 6.4–8.2)
RBC # BLD AUTO: 4.49 M/UL (ref 3.8–5.2)
SODIUM SERPL-SCNC: 142 MMOL/L (ref 136–145)
WBC # BLD AUTO: 5.9 K/UL (ref 3.6–11)

## 2019-05-16 PROCEDURE — 96365 THER/PROPH/DIAG IV INF INIT: CPT

## 2019-05-16 PROCEDURE — 85025 COMPLETE CBC W/AUTO DIFF WBC: CPT

## 2019-05-16 PROCEDURE — 80053 COMPREHEN METABOLIC PANEL: CPT

## 2019-05-16 PROCEDURE — 36415 COLL VENOUS BLD VENIPUNCTURE: CPT

## 2019-05-16 PROCEDURE — 74011250636 HC RX REV CODE- 250/636: Performed by: INTERNAL MEDICINE

## 2019-05-16 PROCEDURE — 74011000258 HC RX REV CODE- 258: Performed by: INTERNAL MEDICINE

## 2019-05-16 RX ADMIN — SODIUM CHLORIDE 1 G: 900 INJECTION, SOLUTION INTRAVENOUS at 16:21

## 2019-05-16 NOTE — PROGRESS NOTES
1615 Pt admit to [facility] for daily Invanz ambulatory in stable condition. Assessment completed. No new concerns voiced. PICC flushed  with positive blood return. Labs drawn per order. 
  
Visit Vitals /61 (BP 1 Location: Left arm, BP Patient Position: Sitting) Pulse (!) 58 Temp 97 °F (36.1 °C) Resp 16  
  
  
Medications: 
NS Flush [facility]  
  
1650 Pt tolerated treatment well. PICC maintained positive blood return throughout treatment. PICC flushed, heparinized and Curos Cap applied to end clave. D/c home ambulatory in no distress.  Pt aware of next appointment scheduled for 5/17/19.

## 2019-05-17 ENCOUNTER — HOSPITAL ENCOUNTER (OUTPATIENT)
Dept: INFUSION THERAPY | Age: 79
Discharge: HOME | End: 2019-05-17
Payer: MEDICARE

## 2019-05-17 VITALS
DIASTOLIC BLOOD PRESSURE: 63 MMHG | SYSTOLIC BLOOD PRESSURE: 128 MMHG | RESPIRATION RATE: 18 BRPM | TEMPERATURE: 97.6 F | HEART RATE: 67 BPM

## 2019-05-17 PROCEDURE — 74011250636 HC RX REV CODE- 250/636: Performed by: INTERNAL MEDICINE

## 2019-05-17 PROCEDURE — 74011000258 HC RX REV CODE- 258: Performed by: INTERNAL MEDICINE

## 2019-05-17 PROCEDURE — 96365 THER/PROPH/DIAG IV INF INIT: CPT

## 2019-05-17 RX ADMIN — ERTAPENEM SODIUM 1 G: 1 INJECTION, POWDER, LYOPHILIZED, FOR SOLUTION INTRAMUSCULAR; INTRAVENOUS at 16:25

## 2019-05-18 ENCOUNTER — HOSPITAL ENCOUNTER (OUTPATIENT)
Dept: INFUSION THERAPY | Age: 79
Discharge: HOME | End: 2019-05-18
Payer: MEDICARE

## 2019-05-18 VITALS
TEMPERATURE: 97.2 F | HEART RATE: 66 BPM | SYSTOLIC BLOOD PRESSURE: 132 MMHG | DIASTOLIC BLOOD PRESSURE: 66 MMHG | OXYGEN SATURATION: 95 % | RESPIRATION RATE: 18 BRPM

## 2019-05-18 PROCEDURE — 74011000258 HC RX REV CODE- 258: Performed by: INTERNAL MEDICINE

## 2019-05-18 PROCEDURE — 96365 THER/PROPH/DIAG IV INF INIT: CPT

## 2019-05-18 PROCEDURE — 74011250636 HC RX REV CODE- 250/636: Performed by: INTERNAL MEDICINE

## 2019-05-18 RX ADMIN — ERTAPENEM SODIUM 1 G: 1 INJECTION, POWDER, LYOPHILIZED, FOR SOLUTION INTRAMUSCULAR; INTRAVENOUS at 10:20

## 2019-05-18 NOTE — PROGRESS NOTES
Outpatient Infusion Center Short Visit Progress Note 36 Pt admit to [facility] for antibiotics ambulatory in stable condition. Assessment completed. No new concerns voiced. Please review pending lab results in [address] [place] [address]. Patient Vitals for the past 12 hrs: 
 Temp Pulse Resp BP SpO2  
05/18/19 1015 97.2 °F (36.2 °C) 66 18 132/66 95 % PICC with positive blood return. Lab drawn, flushed, heparinized and de-accessed per protocol. Medications: 
Invanz IV 
 
1050 Pt tolerated treatment well. D/c home ambulatory in no distress. Pt aware of next appointment scheduled for 5/19/19.

## 2019-05-19 ENCOUNTER — HOSPITAL ENCOUNTER (OUTPATIENT)
Dept: INFUSION THERAPY | Age: 79
Discharge: HOME | End: 2019-05-19
Payer: MEDICARE

## 2019-05-19 VITALS
SYSTOLIC BLOOD PRESSURE: 150 MMHG | HEART RATE: 69 BPM | DIASTOLIC BLOOD PRESSURE: 72 MMHG | TEMPERATURE: 97 F | RESPIRATION RATE: 18 BRPM

## 2019-05-19 PROCEDURE — 74011250636 HC RX REV CODE- 250/636: Performed by: INTERNAL MEDICINE

## 2019-05-19 PROCEDURE — 96365 THER/PROPH/DIAG IV INF INIT: CPT

## 2019-05-19 PROCEDURE — 74011000258 HC RX REV CODE- 258: Performed by: INTERNAL MEDICINE

## 2019-05-19 RX ORDER — SODIUM CHLORIDE 0.9 % (FLUSH) 0.9 %
5-10 SYRINGE (ML) INJECTION AS NEEDED
Status: DISCONTINUED | OUTPATIENT
Start: 2019-05-19 | End: 2019-05-20 | Stop reason: HOSPADM

## 2019-05-19 RX ORDER — HEPARIN 100 UNIT/ML
500 SYRINGE INTRAVENOUS AS NEEDED
Status: DISCONTINUED | OUTPATIENT
Start: 2019-05-19 | End: 2019-05-20 | Stop reason: HOSPADM

## 2019-05-19 RX ADMIN — ERTAPENEM SODIUM 1 G: 1 INJECTION, POWDER, LYOPHILIZED, FOR SOLUTION INTRAMUSCULAR; INTRAVENOUS at 09:34

## 2019-05-19 NOTE — PROGRESS NOTES
[facility] VISIT NOTE Pt arrived at [facility] ambulatory and in no distress for antibiotics. Assessment completed, pt had no complaints. Patient Vitals for the past 12 hrs: 
 Temp Pulse Resp BP  
05/19/19 0935 97 °F (36.1 °C) 69 18 150/72 PICC line flushed with good blood return ntoed. Medications received: 
Invanz IV Tolerated treatment well, no adverse reaction noted. PICC  flushed per protocol. Positive blood return noted. D/C'd from [facility] ambulatory and in no distress accompanied by . Next appointment is 5/20/19 at 4:00.

## 2019-05-20 ENCOUNTER — HOSPITAL ENCOUNTER (OUTPATIENT)
Dept: INFUSION THERAPY | Age: 79
Discharge: HOME | End: 2019-05-20
Payer: MEDICARE

## 2019-05-20 VITALS
TEMPERATURE: 97 F | RESPIRATION RATE: 18 BRPM | HEART RATE: 70 BPM | DIASTOLIC BLOOD PRESSURE: 75 MMHG | SYSTOLIC BLOOD PRESSURE: 127 MMHG

## 2019-05-20 LAB
ALBUMIN SERPL-MCNC: 3.5 G/DL (ref 3.5–5)
ALBUMIN/GLOB SERPL: 1 {RATIO} (ref 1.1–2.2)
ALP SERPL-CCNC: 125 U/L (ref 45–117)
ALT SERPL-CCNC: 39 U/L (ref 12–78)
ANION GAP SERPL CALC-SCNC: 7 MMOL/L (ref 5–15)
AST SERPL-CCNC: 33 U/L (ref 15–37)
BASOPHILS # BLD: 0.1 K/UL (ref 0–0.1)
BASOPHILS NFR BLD: 1 % (ref 0–1)
BILIRUB SERPL-MCNC: 0.6 MG/DL (ref 0.2–1)
BUN SERPL-MCNC: 22 MG/DL (ref 6–20)
BUN/CREAT SERPL: 25 (ref 12–20)
CALCIUM SERPL-MCNC: 9.3 MG/DL (ref 8.5–10.1)
CHLORIDE SERPL-SCNC: 105 MMOL/L (ref 97–108)
CO2 SERPL-SCNC: 29 MMOL/L (ref 21–32)
CREAT SERPL-MCNC: 0.89 MG/DL (ref 0.55–1.02)
CRP SERPL-MCNC: 0.81 MG/DL (ref 0–0.6)
DIFFERENTIAL METHOD BLD: ABNORMAL
EOSINOPHIL # BLD: 0.2 K/UL (ref 0–0.4)
EOSINOPHIL NFR BLD: 2 % (ref 0–7)
ERYTHROCYTE [DISTWIDTH] IN BLOOD BY AUTOMATED COUNT: 15.2 % (ref 11.5–14.5)
ERYTHROCYTE [SEDIMENTATION RATE] IN BLOOD: 30 MM/HR (ref 0–30)
GLOBULIN SER CALC-MCNC: 3.5 G/DL (ref 2–4)
GLUCOSE SERPL-MCNC: 97 MG/DL (ref 65–100)
HCT VFR BLD AUTO: 42.8 % (ref 35–47)
HGB BLD-MCNC: 14 G/DL (ref 11.5–16)
IMM GRANULOCYTES # BLD AUTO: 0.1 K/UL (ref 0–0.04)
IMM GRANULOCYTES NFR BLD AUTO: 1 % (ref 0–0.5)
LYMPHOCYTES # BLD: 1.8 K/UL (ref 0.8–3.5)
LYMPHOCYTES NFR BLD: 28 % (ref 12–49)
MCH RBC QN AUTO: 29.8 PG (ref 26–34)
MCHC RBC AUTO-ENTMCNC: 32.7 G/DL (ref 30–36.5)
MCV RBC AUTO: 91.1 FL (ref 80–99)
MONOCYTES # BLD: 0.5 K/UL (ref 0–1)
MONOCYTES NFR BLD: 8 % (ref 5–13)
NEUTS SEG # BLD: 4 K/UL (ref 1.8–8)
NEUTS SEG NFR BLD: 60 % (ref 32–75)
NRBC # BLD: 0 K/UL (ref 0–0.01)
NRBC BLD-RTO: 0 PER 100 WBC
PLATELET # BLD AUTO: 206 K/UL (ref 150–400)
PMV BLD AUTO: 11.3 FL (ref 8.9–12.9)
POTASSIUM SERPL-SCNC: 3.4 MMOL/L (ref 3.5–5.1)
PROT SERPL-MCNC: 7 G/DL (ref 6.4–8.2)
RBC # BLD AUTO: 4.7 M/UL (ref 3.8–5.2)
SODIUM SERPL-SCNC: 141 MMOL/L (ref 136–145)
WBC # BLD AUTO: 6.6 K/UL (ref 3.6–11)

## 2019-05-20 PROCEDURE — 74011250636 HC RX REV CODE- 250/636: Performed by: INTERNAL MEDICINE

## 2019-05-20 PROCEDURE — 36415 COLL VENOUS BLD VENIPUNCTURE: CPT

## 2019-05-20 PROCEDURE — 74011000258 HC RX REV CODE- 258: Performed by: INTERNAL MEDICINE

## 2019-05-20 PROCEDURE — 85025 COMPLETE CBC W/AUTO DIFF WBC: CPT

## 2019-05-20 PROCEDURE — 85652 RBC SED RATE AUTOMATED: CPT

## 2019-05-20 PROCEDURE — 80053 COMPREHEN METABOLIC PANEL: CPT

## 2019-05-20 PROCEDURE — 86140 C-REACTIVE PROTEIN: CPT

## 2019-05-20 PROCEDURE — 96365 THER/PROPH/DIAG IV INF INIT: CPT

## 2019-05-20 RX ADMIN — SODIUM CHLORIDE 1 G: 900 INJECTION, SOLUTION INTRAVENOUS at 16:00

## 2019-05-20 NOTE — PROGRESS NOTES
Outpatient Infusion Center - Chemotherapy Progress Note 1550 Pt admit to [facility] for daily Invanz ambulatory in stable condition. Assessment completed. No new concerns voiced. PICC line flushed with positive blood return. Labs drawn per order and sent. Visit Vitals /75 Pulse 70 Temp 97 °F (36.1 °C) Resp 18 Medications: 
Invanz 1 g 
 
1635 Pt tolerated treatment well. D/c home ambulatory in no distress. Pt aware of next [facility] appointment scheduled for 05/21/2019. Please review labs in [facility] once resulted.

## 2019-05-21 ENCOUNTER — HOSPITAL ENCOUNTER (OUTPATIENT)
Dept: INFUSION THERAPY | Age: 79
Discharge: HOME | End: 2019-05-21
Payer: MEDICARE

## 2019-05-21 VITALS
SYSTOLIC BLOOD PRESSURE: 142 MMHG | DIASTOLIC BLOOD PRESSURE: 68 MMHG | HEART RATE: 72 BPM | RESPIRATION RATE: 18 BRPM | TEMPERATURE: 98.7 F

## 2019-05-21 PROCEDURE — 74011250636 HC RX REV CODE- 250/636: Performed by: INTERNAL MEDICINE

## 2019-05-21 PROCEDURE — 96365 THER/PROPH/DIAG IV INF INIT: CPT

## 2019-05-21 PROCEDURE — 74011000258 HC RX REV CODE- 258: Performed by: INTERNAL MEDICINE

## 2019-05-21 RX ADMIN — SODIUM CHLORIDE 1 G: 900 INJECTION, SOLUTION INTRAVENOUS at 16:10

## 2019-05-21 NOTE — PROGRESS NOTES
Outpatient Infusion Center - Chemotherapy Progress Note    8522 Pt admit to [facility] for daily Invanz ambulatory in stable condition. Assessment completed. No new concerns voiced. PICC line flushed with positive blood return. Visit Vitals  /68   Pulse 72   Temp 98.7 °F (37.1 °C)   Resp 18       Medications:  Invanz 1 g    1640 Pt tolerated treatment well. D/c home ambulatory in no distress. Pt aware of next [facility] appointment scheduled for 05/22/2019.

## 2019-05-22 ENCOUNTER — HOSPITAL ENCOUNTER (OUTPATIENT)
Dept: INFUSION THERAPY | Age: 79
Discharge: HOME | End: 2019-05-22
Payer: MEDICARE

## 2019-05-22 VITALS
TEMPERATURE: 97.1 F | RESPIRATION RATE: 18 BRPM | DIASTOLIC BLOOD PRESSURE: 63 MMHG | SYSTOLIC BLOOD PRESSURE: 140 MMHG | HEART RATE: 68 BPM

## 2019-05-22 PROCEDURE — 74011250636 HC RX REV CODE- 250/636: Performed by: INTERNAL MEDICINE

## 2019-05-22 PROCEDURE — 96365 THER/PROPH/DIAG IV INF INIT: CPT

## 2019-05-22 PROCEDURE — 74011000258 HC RX REV CODE- 258: Performed by: INTERNAL MEDICINE

## 2019-05-22 RX ADMIN — SODIUM CHLORIDE 1 G: 900 INJECTION, SOLUTION INTRAVENOUS at 15:58

## 2019-05-22 NOTE — PROGRESS NOTES
Outpatient Infusion Center Short Visit Progress Note    1600 Pt admit to [facility] for daily Invanz ambulatory in stable condition. Assessment completed. No new concerns voiced. Patient Vitals for the past 12 hrs:   Temp Pulse Resp BP   05/22/19 1557 97.1 °F (36.2 °C) 68 18 140/63       PICC with positive blood return flushed, heparinized and de-accessed per protocol. PICC line removed. Medications:  Invanz    1700 Pt tolerated treatment well. D/c home ambulatory in no distress. Pt has completed all treatments.

## 2020-11-02 PROBLEM — L02.416 ABSCESS OF LEFT THIGH: Status: RESOLVED | Noted: 2019-04-10 | Resolved: 2020-11-02

## 2020-11-04 PROBLEM — N18.30 CKD (CHRONIC KIDNEY DISEASE) STAGE 3, GFR 30-59 ML/MIN (HCC): Status: ACTIVE | Noted: 2020-11-02

## 2020-11-06 ENCOUNTER — HOSPITAL ENCOUNTER (OUTPATIENT)
Dept: MAMMOGRAPHY | Age: 80
Discharge: HOME | End: 2020-11-06
Attending: INTERNAL MEDICINE
Payer: MEDICARE

## 2020-11-06 DIAGNOSIS — Z12.31 BREAST CANCER SCREENING BY MAMMOGRAM: ICD-10-CM

## 2020-11-06 PROCEDURE — 77067 SCR MAMMO BI INCL CAD: CPT

## 2021-11-04 PROBLEM — E78.5 HYPERLIPIDEMIA: Status: ACTIVE | Noted: 2019-03-11

## 2022-03-19 PROBLEM — N18.30 CKD (CHRONIC KIDNEY DISEASE) STAGE 3, GFR 30-59 ML/MIN (HCC): Status: ACTIVE | Noted: 2020-11-02

## 2022-03-19 PROBLEM — E78.5 HYPERLIPIDEMIA: Status: ACTIVE | Noted: 2019-03-11

## 2022-03-20 PROBLEM — M71.162 INFECTION OF LEFT PREPATELLAR BURSA: Status: ACTIVE | Noted: 2019-04-10

## 2023-05-24 RX ORDER — LISINOPRIL 10 MG/1
10 TABLET ORAL DAILY
COMMUNITY
Start: 2022-11-07

## 2023-05-24 RX ORDER — PRAVASTATIN SODIUM 40 MG
1 TABLET ORAL DAILY
COMMUNITY
Start: 2022-11-07

## 2023-05-24 RX ORDER — CHLORTHALIDONE 25 MG/1
1 TABLET ORAL DAILY
COMMUNITY
Start: 2022-11-07

## 2023-10-17 ENCOUNTER — APPOINTMENT (OUTPATIENT)
Facility: HOSPITAL | Age: 83
End: 2023-10-17
Payer: MEDICARE

## 2023-10-17 ENCOUNTER — HOSPITAL ENCOUNTER (EMERGENCY)
Facility: HOSPITAL | Age: 83
Discharge: HOME | End: 2023-10-18
Attending: EMERGENCY MEDICINE
Payer: MEDICARE

## 2023-10-17 DIAGNOSIS — S81.811A LACERATION OF RIGHT LOWER EXTREMITY, INITIAL ENCOUNTER: ICD-10-CM

## 2023-10-17 DIAGNOSIS — S43.004A DISLOCATION OF RIGHT SHOULDER JOINT, INITIAL ENCOUNTER: ICD-10-CM

## 2023-10-17 DIAGNOSIS — S02.2XXA CLOSED FRACTURE OF NASAL BONE, INITIAL ENCOUNTER: ICD-10-CM

## 2023-10-17 DIAGNOSIS — T14.8XXA ABRASION: ICD-10-CM
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
## 2023-10-17 DIAGNOSIS — S09.90XA CLOSED HEAD INJURY, INITIAL ENCOUNTER: Primary | ICD-10-CM

## 2023-10-17 PROCEDURE — 70486 CT MAXILLOFACIAL W/O DYE: CPT

## 2023-10-17 PROCEDURE — 73020 X-RAY EXAM OF SHOULDER: CPT

## 2023-10-17 PROCEDURE — 90471 IMMUNIZATION ADMIN: CPT | Performed by: STUDENT IN AN ORGANIZED HEALTH CARE EDUCATION/TRAINING PROGRAM

## 2023-10-17 PROCEDURE — 2580000003 HC RX 258: Performed by: EMERGENCY MEDICINE

## 2023-10-17 PROCEDURE — 72125 CT NECK SPINE W/O DYE: CPT

## 2023-10-17 PROCEDURE — 99285 EMERGENCY DEPT VISIT HI MDM: CPT

## 2023-10-17 PROCEDURE — 96376 TX/PRO/DX INJ SAME DRUG ADON: CPT

## 2023-10-17 PROCEDURE — 96375 TX/PRO/DX INJ NEW DRUG ADDON: CPT

## 2023-10-17 PROCEDURE — 6360000002 HC RX W HCPCS: Performed by: STUDENT IN AN ORGANIZED HEALTH CARE EDUCATION/TRAINING PROGRAM

## 2023-10-17 PROCEDURE — 73560 X-RAY EXAM OF KNEE 1 OR 2: CPT

## 2023-10-17 PROCEDURE — 96374 THER/PROPH/DIAG INJ IV PUSH: CPT

## 2023-10-17 PROCEDURE — 2500000003 HC RX 250 WO HCPCS: Performed by: STUDENT IN AN ORGANIZED HEALTH CARE EDUCATION/TRAINING PROGRAM

## 2023-10-17 PROCEDURE — 6360000002 HC RX W HCPCS: Performed by: EMERGENCY MEDICINE

## 2023-10-17 PROCEDURE — 70450 CT HEAD/BRAIN W/O DYE: CPT

## 2023-10-17 PROCEDURE — 90714 TD VACC NO PRESV 7 YRS+ IM: CPT | Performed by: STUDENT IN AN ORGANIZED HEALTH CARE EDUCATION/TRAINING PROGRAM

## 2023-10-17 RX ORDER — TETANUS AND DIPHTHERIA TOXOIDS ADSORBED 2; 2 [LF]/.5ML; [LF]/.5ML
0.5 INJECTION INTRAMUSCULAR ONCE
Status: DISCONTINUED | OUTPATIENT
Start: 2023-10-17 | End: 2023-10-18 | Stop reason: HOSPADM

## 2023-10-17 RX ORDER — ONDANSETRON 2 MG/ML
4 INJECTION INTRAMUSCULAR; INTRAVENOUS EVERY 6 HOURS PRN
Status: DISCONTINUED | OUTPATIENT
Start: 2023-10-17 | End: 2023-10-18 | Stop reason: HOSPADM

## 2023-10-17 RX ORDER — TETANUS AND DIPHTHERIA TOXOIDS ADSORBED 2; 2 [LF]/.5ML; [LF]/.5ML
0.5 INJECTION INTRAMUSCULAR ONCE
Status: COMPLETED | OUTPATIENT
Start: 2023-10-17 | End: 2023-10-17

## 2023-10-17 RX ORDER — LORAZEPAM 2 MG/ML
0.5 INJECTION INTRAMUSCULAR ONCE
Status: COMPLETED | OUTPATIENT
Start: 2023-10-17 | End: 2023-10-17

## 2023-10-17 RX ORDER — HYDROMORPHONE HYDROCHLORIDE 1 MG/ML
1 INJECTION, SOLUTION INTRAMUSCULAR; INTRAVENOUS; SUBCUTANEOUS
Status: COMPLETED | OUTPATIENT
Start: 2023-10-17 | End: 2023-10-18

## 2023-10-17 RX ORDER — LIDOCAINE HYDROCHLORIDE 10 MG/ML
10 INJECTION, SOLUTION EPIDURAL; INFILTRATION; INTRACAUDAL; PERINEURAL
Status: COMPLETED | OUTPATIENT
Start: 2023-10-17 | End: 2023-10-17

## 2023-10-17 RX ORDER — HYDROMORPHONE HYDROCHLORIDE 1 MG/ML
0.5 INJECTION, SOLUTION INTRAMUSCULAR; INTRAVENOUS; SUBCUTANEOUS
Status: COMPLETED | OUTPATIENT
Start: 2023-10-17 | End: 2023-10-17

## 2023-10-17 RX ADMIN — LIDOCAINE HYDROCHLORIDE 10 ML: 10 INJECTION, SOLUTION EPIDURAL; INFILTRATION; INTRACAUDAL; PERINEURAL at 20:27

## 2023-10-17 RX ADMIN — WATER 1000 MG: 1 INJECTION INTRAMUSCULAR; INTRAVENOUS; SUBCUTANEOUS at 22:54

## 2023-10-17 RX ADMIN — LORAZEPAM 0.5 MG: 2 INJECTION INTRAMUSCULAR; INTRAVENOUS at 22:53

## 2023-10-17 RX ADMIN — TETANUS AND DIPHTHERIA TOXOIDS ADSORBED 0.5 ML: 2; 2 INJECTION INTRAMUSCULAR at 20:26

## 2023-10-17 RX ADMIN — HYDROMORPHONE HYDROCHLORIDE 0.5 MG: 1 INJECTION, SOLUTION INTRAMUSCULAR; INTRAVENOUS; SUBCUTANEOUS at 20:39

## 2023-10-17 ASSESSMENT — ENCOUNTER SYMPTOMS
ROS SKIN COMMENTS: SEE HPI
VOMITING: 0
RHINORRHEA: 0
SORE THROAT: 0
SINUS PRESSURE: 0
TROUBLE SWALLOWING: 0
WHEEZING: 0
CHEST TIGHTNESS: 0
BACK PAIN: 0
COLOR CHANGE: 0
COUGH: 0
ABDOMINAL PAIN: 0
BLOOD IN STOOL: 0
EYES NEGATIVE: 1
STRIDOR: 0
SINUS PAIN: 0
DIARRHEA: 0
NAUSEA: 0
SHORTNESS OF BREATH: 0

## 2023-10-17 ASSESSMENT — PAIN - FUNCTIONAL ASSESSMENT: PAIN_FUNCTIONAL_ASSESSMENT: 0-10

## 2023-10-17 ASSESSMENT — PAIN SCALES - GENERAL
PAINLEVEL_OUTOF10: 8
PAINLEVEL_OUTOF10: 4

## 2023-10-17 ASSESSMENT — PAIN DESCRIPTION - LOCATION
LOCATION: ARM
LOCATION: SHOULDER

## 2023-10-17 ASSESSMENT — PAIN DESCRIPTION - DESCRIPTORS: DESCRIPTORS: DISCOMFORT

## 2023-10-17 ASSESSMENT — PAIN DESCRIPTION - ORIENTATION
ORIENTATION: RIGHT
ORIENTATION: RIGHT

## 2023-10-17 NOTE — ED TRIAGE NOTES
Patient presents from home with complaints of tripping over her husbands foot and falling. Patient hit her head, right shoulder and knee when she fell. Patient A&O times 4 and denies LOC and blood thinners. Patient arrives with abrasion to nose and large laceration to right knee with adipose tissue visible. Possible right shoulder dislocation as well.  Patient received 100 mcg fentanyl

## 2023-10-18 ENCOUNTER — APPOINTMENT (OUTPATIENT)
Facility: HOSPITAL | Age: 83
End: 2023-10-18
Payer: MEDICARE

## 2023-10-18 VITALS
HEART RATE: 70 BPM | SYSTOLIC BLOOD PRESSURE: 148 MMHG | BODY MASS INDEX: 43.06 KG/M2 | WEIGHT: 266.76 LBS | TEMPERATURE: 98 F | RESPIRATION RATE: 13 BRPM | OXYGEN SATURATION: 93 % | DIASTOLIC BLOOD PRESSURE: 62 MMHG

## 2023-10-18 PROCEDURE — 6360000002 HC RX W HCPCS: Performed by: STUDENT IN AN ORGANIZED HEALTH CARE EDUCATION/TRAINING PROGRAM

## 2023-10-18 PROCEDURE — 12035 INTMD RPR S/A/T/EXT 12.6-20: CPT

## 2023-10-18 PROCEDURE — 73030 X-RAY EXAM OF SHOULDER: CPT

## 2023-10-18 PROCEDURE — 23650 CLTX SHO DSLC W/MNPJ WO ANES: CPT

## 2023-10-18 PROCEDURE — 6360000002 HC RX W HCPCS: Performed by: EMERGENCY MEDICINE

## 2023-10-18 RX ORDER — AMOXICILLIN AND CLAVULANATE POTASSIUM 875; 125 MG/1; MG/1
1 TABLET, FILM COATED ORAL 2 TIMES DAILY
Qty: 14 TABLET | Refills: 0 | Status: SHIPPED | OUTPATIENT
Start: 2023-10-18 | End: 2023-10-18 | Stop reason: SDUPTHER

## 2023-10-18 RX ORDER — AMOXICILLIN AND CLAVULANATE POTASSIUM 875; 125 MG/1; MG/1
1 TABLET, FILM COATED ORAL 2 TIMES DAILY
Qty: 14 TABLET | Refills: 0 | Status: SHIPPED | OUTPATIENT
Start: 2023-10-18 | End: 2023-10-25

## 2023-10-18 RX ADMIN — PROPOFOL 100 MG: 10 INJECTION, EMULSION INTRAVENOUS at 02:11

## 2023-10-18 RX ADMIN — HYDROMORPHONE HYDROCHLORIDE 1 MG: 1 INJECTION, SOLUTION INTRAMUSCULAR; INTRAVENOUS; SUBCUTANEOUS at 00:07

## 2023-10-18 NOTE — ED PROVIDER NOTES
[facility] EMERGENCY DEP  EMERGENCY DEPARTMENT ENCOUNTER      Pt Name: Brittany Mondragon  MRN: [id number]  [address] [facility] 1940  Date of evaluation: 10/17/2023  Provider: Jian Jeffries MD    CHIEF COMPLAINT       Chief Complaint   Patient presents with    Fall         HISTORY OF PRESENT ILLNESS    HPI  This is a 27-year-old female who got up to cook dinner and tripped on her 's foot. She fell to a carpeted floor over a concrete and hit her face and tried to catch herself with her right hand. She also hit her right knee on the floor and is complaining of pain in the right shoulder as well as a laceration in her knee. She did not lose consciousness and does not complain of any headache. She had some bruising on the bridge of her nose. She voices no other acute complaint. She was given 100 mics of fentanyl on the way to the hospital.  She still complaining of some pain and wanted some additional medication. She voices no other acute complaints. Review of External Medical Records:     Nursing Notes were reviewed. REVIEW OF SYSTEMS       Review of Systems   Constitutional:  Negative for activity change, chills, fatigue and fever. HENT:  Negative for congestion, ear pain, rhinorrhea, sinus pressure, sinus pain, sore throat and trouble swallowing. See HPI   Eyes: Negative. Respiratory:  Negative for cough, chest tightness, shortness of breath, wheezing and stridor. Cardiovascular:  Negative for chest pain, palpitations and leg swelling. Gastrointestinal:  Negative for abdominal pain, blood in stool, diarrhea, nausea and vomiting. Endocrine: Negative. Genitourinary:  Negative for decreased urine volume, difficulty urinating, flank pain, frequency and hematuria. Musculoskeletal:  Negative for back pain, joint swelling and neck pain. See HPI   Skin:  Negative for color change, pallor and rash.         See HPI   Neurological:  Negative for dizziness, syncope, speech

## 2023-10-18 NOTE — ED PROVIDER NOTES
Patient signed out to me by Dr. Berry Ross at 1 AM pending procedural sedation for reduction of dislocated right shoulder. Patient was sedated with propofol in the orthopedic PA came in to assist with reduction. Shoulder was successfully reduced on the first attempt with sedation. Follow-up x-ray confirmed. Patient stable for discharge home with a sling. She has been given follow-up information for orthopedics. Judy Melvin MD  10/18/23 0237      Patient Name: Conrad Ascencio   Medical Record Number: [id number]  Date: 10/18/2023   Time: 2:38 AM   Room/Bed: [facility]/  Conscious Sedation Procedure Note  Indication: shoulder dislocation    Consent: I have discussed with the patient and/or the patient representative the indication, alternatives, and the possible risks and/or complications of the planned procedure and the anesthesia methods. The patient and/or patient representative appear to understand and agree to proceed. Physician Involvement: The attending physician was present and supervising this procedure. Pre-Sedation Documentation and Exam: I have personally completed a history, physical exam & review of systems for this patient (see notes). Vital signs have been reviewed (see flow sheet for vitals). I have reviewed the patient's history and review of systems. Vital Signs: Heart Rate- 62, Respirations-15 , Blood Pressure-146/79 , Pulse Ox-94 %. Lungs: clear, Cardiovascular: regular rate and rhythm.   Airway Assessment: Mallampati Class III - (soft palate & base of uvula are visible)    Prior History of Anesthesia Complications: none    ASA Classification: Class 3 - A patient with severe systemic disease that limits activity but is not incapacitating    Sedation/ Anesthesia Plan: intravenous sedation    Medications Used: propofol intravenously    Monitoring and Safety: The patient was placed on a cardiac monitor and vital signs, pulse oximetry and level of consciousness were continuously

## 2023-11-30 PROBLEM — N18.30 STAGE 3 CHRONIC KIDNEY DISEASE (HCC): Status: ACTIVE | Noted: 2020-11-02

## 2024-02-21 ENCOUNTER — HOSPITAL ENCOUNTER (OUTPATIENT)
Facility: HOSPITAL | Age: 84
Discharge: HOME | End: 2024-02-24
Payer: MEDICARE

## 2024-02-21 DIAGNOSIS — N18.31 CHRONIC KIDNEY DISEASE, STAGE 3A (HCC): ICD-10-CM

## 2024-02-21 PROCEDURE — 76770 US EXAM ABDO BACK WALL COMP: CPT

## 2024-03-07 NOTE — PROGRESS NOTES
Anesthesia Transfer of Care Note    Patient: Solo Black    Procedure(s) Performed: * No procedures listed *    Patient location: [facility]    Anesthesia Type: general    Transport from OR: Transported from OR on 6-10 L/min O2 by face mask with adequate spontaneous ventilation    Post pain: adequate analgesia    Post assessment: tolerated procedure well and no apparent anesthetic complications    Post vital signs: stable    Level of consciousness: awake and alert    Nausea/Vomiting: no nausea/vomiting    Complications: none    Transfer of care protocol was followed    Last vitals: Visit Vitals  /63   Pulse 67   Temp 36.4 °C (97.5 °F) (Temporal)   Resp 15   Ht 6' (1.829 m)   Wt 86.2 kg (190 lb)   SpO2 100%   BMI 25.77 kg/m²      Outpatient Infusion Center Short Visit Progress Note 1600 Patient admitted to [facility] for Eula Malcolm ambulatory in stable condition. Assessment completed. No new concerns voiced. PICC flushed well with positive blood return and heparinized and capped per protocol. Labs drawn and sent for processing. Visit Vitals /64 (BP 1 Location: Left arm, BP Patient Position: Sitting) Pulse 73 Temp 97.5 °F (36.4 °C) Resp 18 SpO2 98% Recent Results (from the past 12 hour(s)) CBC WITH AUTOMATED DIFF Collection Time: 05/09/19  4:13 PM  
Result Value Ref Range WBC 6.8 3.6 - 11.0 K/uL  
 RBC 4.57 3.80 - 5.20 M/uL  
 HGB 13.5 11.5 - 16.0 g/dL HCT 42.0 35.0 - 47.0 % MCV 91.9 80.0 - 99.0 FL  
 MCH 29.5 26.0 - 34.0 PG  
 MCHC 32.1 30.0 - 36.5 g/dL  
 RDW 15.1 (H) 11.5 - 14.5 % PLATELET 636 247 - 038 K/uL MPV 11.3 8.9 - 12.9 FL  
 NRBC 0.0 0  WBC ABSOLUTE NRBC 0.00 0.00 - 0.01 K/uL NEUTROPHILS 64 32 - 75 % LYMPHOCYTES 24 12 - 49 % MONOCYTES 6 5 - 13 % EOSINOPHILS 4 0 - 7 % BASOPHILS 1 0 - 1 % IMMATURE GRANULOCYTES 1 (H) 0.0 - 0.5 % ABS. NEUTROPHILS 4.4 1.8 - 8.0 K/UL  
 ABS. LYMPHOCYTES 1.6 0.8 - 3.5 K/UL  
 ABS. MONOCYTES 0.4 0.0 - 1.0 K/UL  
 ABS. EOSINOPHILS 0.2 0.0 - 0.4 K/UL  
 ABS. BASOPHILS 0.1 0.0 - 0.1 K/UL  
 ABS. IMM. GRANS. 0.1 (H) 0.00 - 0.04 K/UL  
 DF AUTOMATED Medications: 
Invanz IV 
 
1650 Patient tolerated treatment well. Patient discharged from [facility] 58 ambulatory in no distress at 1650. Patient aware of next appointment scheduled for 5/10/19.

## 2024-12-02 PROBLEM — M48.00 SPINAL STENOSIS: Status: ACTIVE | Noted: 2024-12-02

## 2024-12-02 PROBLEM — D69.2 SENILE PURPURA: Status: ACTIVE | Noted: 2024-12-02

## 2024-12-02 PROBLEM — G95.9 SPINAL CORD DISORDER (HCC): Status: ACTIVE | Noted: 2024-12-02

## 2025-02-17 ENCOUNTER — APPOINTMENT (OUTPATIENT)
Facility: HOSPITAL | Age: 85
End: 2025-02-17
Payer: MEDICARE

## 2025-02-17 ENCOUNTER — HOSPITAL ENCOUNTER (EMERGENCY)
Facility: HOSPITAL | Age: 85
Discharge: HOME | End: 2025-02-17
Attending: EMERGENCY MEDICINE
Payer: MEDICARE

## 2025-02-17 VITALS
RESPIRATION RATE: 18 BRPM | SYSTOLIC BLOOD PRESSURE: 133 MMHG | TEMPERATURE: 97.7 F | DIASTOLIC BLOOD PRESSURE: 102 MMHG | HEIGHT: 66 IN | HEART RATE: 89 BPM | OXYGEN SATURATION: 100 % | BODY MASS INDEX: 41.97 KG/M2

## 2025-02-17 DIAGNOSIS — M79.605 LEFT LEG PAIN: Primary | ICD-10-CM

## 2025-02-17 PROCEDURE — 93971 EXTREMITY STUDY: CPT

## 2025-02-17 PROCEDURE — 99284 EMERGENCY DEPT VISIT MOD MDM: CPT

## 2025-02-17 ASSESSMENT — PAIN DESCRIPTION - FREQUENCY: FREQUENCY: CONTINUOUS

## 2025-02-17 ASSESSMENT — PAIN - FUNCTIONAL ASSESSMENT
PAIN_FUNCTIONAL_ASSESSMENT: ACTIVITIES ARE NOT PREVENTED
PAIN_FUNCTIONAL_ASSESSMENT: 0-10

## 2025-02-17 ASSESSMENT — LIFESTYLE VARIABLES
HOW MANY STANDARD DRINKS CONTAINING ALCOHOL DO YOU HAVE ON A TYPICAL DAY: PATIENT DOES NOT DRINK
HOW OFTEN DO YOU HAVE A DRINK CONTAINING ALCOHOL: NEVER

## 2025-02-17 ASSESSMENT — PAIN DESCRIPTION - ONSET: ONSET: ON-GOING

## 2025-02-17 ASSESSMENT — PAIN DESCRIPTION - PAIN TYPE: TYPE: ACUTE PAIN

## 2025-02-17 ASSESSMENT — PAIN DESCRIPTION - ORIENTATION: ORIENTATION: LEFT

## 2025-02-17 ASSESSMENT — PAIN DESCRIPTION - LOCATION: LOCATION: LEG;FOOT

## 2025-02-17 ASSESSMENT — PAIN DESCRIPTION - DESCRIPTORS: DESCRIPTORS: ACHING;DISCOMFORT

## 2025-02-17 ASSESSMENT — PAIN SCALES - GENERAL: PAINLEVEL_OUTOF10: 4

## 2025-02-17 NOTE — ED PROVIDER NOTES
[facility] EMERGENCY DEPARTMENT  EMERGENCY DEPARTMENT ENCOUNTER      Pt Name: Lisa Franz  MRN: [id number]  Birthdate 1940  Date of evaluation: 2/17/2025  Provider: Andrea Cervantes PA-C    CHIEF COMPLAINT       Chief Complaint   Patient presents with    Leg Pain         HISTORY OF PRESENT ILLNESS   (Location/Symptom, Timing/Onset, Context/Setting, Quality, Duration, Modifying Factors, Severity)  Note limiting factors.   Lisa Franz is a 84 y.o. female with a PMHx of HHTN, CKD, and HLD who presents to the emergency department left lower extremity edema for the past 2 days. Denies fever, chills, shortness of breath or chest pain, falls, or injuries. She was previously seen by her PCP who advised she follow up to the ED to rule out a blood clot. Patient denies any calf pain, tenderness, lower extremity wounds or ulcers.     The history is provided by the patient.         Review of External Medical Records:     Nursing Notes were reviewed.    REVIEW OF SYSTEMS    (2-9 systems for level 4, 10 or more for level 5)     Review of Systems    Except as noted above the remainder of the review of systems was reviewed and negative.       PAST MEDICAL HISTORY     Past Medical History:   Diagnosis Date    Arthritis     Fracture     Left femur fractured distally; pt fell    Hyperlipidemia     Hypertension     Menopause          SURGICAL HISTORY       Past Surgical History:   Procedure Laterality Date    APPENDECTOMY  1967    HEENT      HEENT      WISDOM TEETH X4    ORTHOPEDIC SURGERY Right 2014    Right THR    ORTHOPEDIC SURGERY Left 2017    2 PLATES FOR FEMUR FX REPAIR         CURRENT MEDICATIONS       Discharge Medication List as of 2/17/2025  2:14 PM        CONTINUE these medications which have NOT CHANGED    Details   chlorthalidone (HYGROTON) 25 MG tablet Take 1 tablet by mouth once daily, Disp-90 tablet, R-0Normal      levothyroxine (SYNTHROID) 25 MCG tablet Take 1 tablet by mouth daily, Disp-90

## 2025-02-17 NOTE — ED TRIAGE NOTES
Patient arrives to the ED for complaints of L lower leg pain and swelling. Patient has +4 pitting edema to L foot. Denies shortness of breath and chest pain.

## 2025-02-25 ENCOUNTER — HOSPITAL ENCOUNTER (OUTPATIENT)
Facility: HOSPITAL | Age: 85
Discharge: HOME | End: 2025-02-27
Attending: FAMILY MEDICINE
Payer: MEDICARE

## 2025-02-25 DIAGNOSIS — M79.605 PAIN OF LEFT LOWER EXTREMITY: ICD-10-CM

## 2025-02-25 LAB
VAS LEFT ATA PROX PSV: 97.8 CM/S
VAS LEFT CFA PROX PSV: 169.6 CM/S
VAS LEFT PFA PROX PSV: 67.8 CM/S
VAS LEFT POP A DIST PSV: 65 CM/S
VAS LEFT POP A PROX PSV: 77.5 CM/S
VAS LEFT POP A PROX VEL RATIO: 0.76
VAS LEFT PTA PROX PSV: 125.9 CM/S
VAS LEFT SFA DIST PSV: 102.6 CM/S
VAS LEFT SFA DIST VEL RATIO: 1.09
VAS LEFT SFA MID PSV: 94.3 CM/S
VAS LEFT SFA MID VEL RATIO: 0.81
VAS LEFT SFA PROX PSV: 116.9 CM/S
VAS LEFT SFA PROX VEL RATIO: 0.69

## 2025-02-25 PROCEDURE — 93926 LOWER EXTREMITY STUDY: CPT

## 2025-02-26 ENCOUNTER — TRANSCRIBE ORDERS (OUTPATIENT)
Facility: HOSPITAL | Age: 85
End: 2025-02-26

## 2025-02-26 DIAGNOSIS — M79.605 PAIN OF LEFT LOWER EXTREMITY: Primary | ICD-10-CM

## 2025-02-26 NOTE — RESULT ENCOUNTER NOTE
Called regarding arterial Dopplers showing mild amount of plaque in the common femoral and proximal superficial femoral arteries but not significant hemodynamically.  No further evaluation or treatment necessary.  Patient states swelling is going down.  Follow-up as needed.  Agreed and expressed understanding.

## (undated) DEVICE — SWAB CULT DBL W/O CHAR RAYON TIP AMIES GEL CLMN FOR COLL

## (undated) DEVICE — CUSTOM CAST PD STR

## (undated) DEVICE — SPONGE LAP 18X18IN STRL -- 5/PK

## (undated) DEVICE — OCCLUSIVE GAUZE STRIP,3% BISMUTH TRIBROMOPHENATE IN PETROLATUM BLEND: Brand: XEROFORM

## (undated) DEVICE — STOCKINETTE,IMPERVIOUS,12X48,STERILE: Brand: MEDLINE

## (undated) DEVICE — SUTURE ETHLN 0 L96IN NONABSORBABLE BLK TP-1 L65MM NYL 6 L880G

## (undated) DEVICE — Device

## (undated) DEVICE — ZIMMER® STERILE DISPOSABLE TOURNIQUET CUFF WITH PLC, DUAL PORT, SINGLE BLADDER, 34 IN. (86 CM)

## (undated) DEVICE — BANDAGE COMPR SELF ADH 5 YDX6 IN TAN STRL PREMIERPRO LF

## (undated) DEVICE — INTENDED FOR TISSUE SEPARATION, AND OTHER PROCEDURES THAT REQUIRE A SHARP SURGICAL BLADE TO PUNCTURE OR CUT.: Brand: BARD-PARKER ® CARBON RIB-BACK BLADES

## (undated) DEVICE — GAUZE SPONGES,12 PLY: Brand: CURITY

## (undated) DEVICE — INFECTION CONTROL KIT SYS

## (undated) DEVICE — PREP SKN PREVAIL 40ML APPL --

## (undated) DEVICE — SUTURE VCRL SZ 0 L36IN ABSRB VLT L40MM CT 1/2 CIR J358H

## (undated) DEVICE — NON-ADHERENT STRIPS,OIL EMULSION: Brand: CURITY

## (undated) DEVICE — SOLUTION IV 1000ML 0.9% SOD CHL

## (undated) DEVICE — SURGICAL PROCEDURE PACK BASIN MAJ SET CUST NO CAUT

## (undated) DEVICE — ABDOMINAL PAD: Brand: DERMACEA

## (undated) DEVICE — STERILE POLYISOPRENE POWDER-FREE SURGICAL GLOVES: Brand: PROTEXIS

## (undated) DEVICE — STERILE POLYISOPRENE POWDER-FREE SURGICAL GLOVES WITH EMOLLIENT COATING: Brand: PROTEXIS

## (undated) DEVICE — CURITY IDOFORM PACKING STRIP: Brand: CURITY

## (undated) DEVICE — SUTURE VCRL SZ 2-0 L36IN ABSRB UD L36MM CT-1 1/2 CIR J945H

## (undated) DEVICE — HOOK LOCK LATEX FREE ELASTIC BANDAGE 6INX5YD

## (undated) DEVICE — PACK,BASIC,SIRUS,V: Brand: MEDLINE

## (undated) DEVICE — DRAPE,REIN 53X77,STERILE: Brand: MEDLINE

## (undated) DEVICE — TOWEL SURG W17XL27IN STD BLU COT NONFENESTRATED PREWASHED

## (undated) DEVICE — DEVON™ KNEE AND BODY STRAP 60" X 3" (1.5 M X 7.6 CM): Brand: DEVON

## (undated) DEVICE — REM POLYHESIVE ADULT PATIENT RETURN ELECTRODE: Brand: VALLEYLAB

## (undated) DEVICE — DRAPE,U/ SHT,SPLIT,PLAS,STERIL: Brand: MEDLINE

## (undated) DEVICE — IMMOBILIZER PREMIER PRO KNEE CUTAWAY CNTOUR 22INCH COOL BLU

## (undated) DEVICE — PADDING CST 6IN STERILE --

## (undated) DEVICE — SUTURE NONABSORBABLE L60IN L65MM SZ 1-0 BLK 1 1/2 CIR NYL 824G

## (undated) DEVICE — DRAPE,EXTREMITY,89X128,STERILE: Brand: MEDLINE

## (undated) DEVICE — ROCKER SWITCH PENCIL BLADE ELECTRODE, HOLSTER: Brand: EDGE